# Patient Record
Sex: MALE | Race: OTHER | NOT HISPANIC OR LATINO
[De-identification: names, ages, dates, MRNs, and addresses within clinical notes are randomized per-mention and may not be internally consistent; named-entity substitution may affect disease eponyms.]

---

## 2018-05-22 ENCOUNTER — APPOINTMENT (OUTPATIENT)
Dept: ORTHOPEDIC SURGERY | Facility: CLINIC | Age: 78
End: 2018-05-22
Payer: MEDICARE

## 2018-05-22 VITALS
HEART RATE: 61 BPM | BODY MASS INDEX: 30.22 KG/M2 | SYSTOLIC BLOOD PRESSURE: 122 MMHG | DIASTOLIC BLOOD PRESSURE: 51 MMHG | WEIGHT: 188 LBS | HEIGHT: 66 IN

## 2018-05-22 DIAGNOSIS — Z47.1 AFTERCARE FOLLOWING JOINT REPLACEMENT SURGERY: ICD-10-CM

## 2018-05-22 PROCEDURE — 73562 X-RAY EXAM OF KNEE 3: CPT | Mod: 50

## 2018-05-22 PROCEDURE — 99213 OFFICE O/P EST LOW 20 MIN: CPT

## 2021-12-29 ENCOUNTER — INPATIENT (INPATIENT)
Facility: HOSPITAL | Age: 81
LOS: 7 days | Discharge: HOSPICE | DRG: 291 | End: 2022-01-06
Attending: INTERNAL MEDICINE | Admitting: HOSPITALIST
Payer: MEDICARE

## 2021-12-29 VITALS
RESPIRATION RATE: 32 BRPM | SYSTOLIC BLOOD PRESSURE: 107 MMHG | OXYGEN SATURATION: 97 % | DIASTOLIC BLOOD PRESSURE: 88 MMHG | HEIGHT: 68 IN | TEMPERATURE: 98 F | HEART RATE: 49 BPM | WEIGHT: 184.97 LBS

## 2021-12-29 PROCEDURE — 99285 EMERGENCY DEPT VISIT HI MDM: CPT | Mod: GC

## 2021-12-29 NOTE — ED ADULT TRIAGE NOTE - CHIEF COMPLAINT QUOTE
labored breathing x 5 days; cough; MILLS; hx pulmonary embolus; saw PMD and had neg covid test 7 days ago; pt is confused;

## 2021-12-30 DIAGNOSIS — I50.9 HEART FAILURE, UNSPECIFIED: ICD-10-CM

## 2021-12-30 LAB
ALBUMIN SERPL ELPH-MCNC: 3.6 G/DL — SIGNIFICANT CHANGE UP (ref 3.3–5)
ALP SERPL-CCNC: 190 U/L — HIGH (ref 40–120)
ALT FLD-CCNC: 54 U/L — HIGH (ref 10–45)
ANION GAP SERPL CALC-SCNC: 14 MMOL/L — SIGNIFICANT CHANGE UP (ref 5–17)
ANION GAP SERPL CALC-SCNC: 14 MMOL/L — SIGNIFICANT CHANGE UP (ref 5–17)
APTT BLD: 174.1 SEC — CRITICAL HIGH (ref 27.5–35.5)
APTT BLD: 29.1 SEC — SIGNIFICANT CHANGE UP (ref 27.5–35.5)
APTT BLD: >200 SEC — CRITICAL HIGH (ref 27.5–35.5)
AST SERPL-CCNC: 43 U/L — HIGH (ref 10–40)
BASOPHILS # BLD AUTO: 0.04 K/UL — SIGNIFICANT CHANGE UP (ref 0–0.2)
BASOPHILS NFR BLD AUTO: 0.4 % — SIGNIFICANT CHANGE UP (ref 0–2)
BILIRUB SERPL-MCNC: 1.3 MG/DL — HIGH (ref 0.2–1.2)
BUN SERPL-MCNC: 24 MG/DL — HIGH (ref 7–23)
BUN SERPL-MCNC: 25 MG/DL — HIGH (ref 7–23)
CALCIUM SERPL-MCNC: 8.9 MG/DL — SIGNIFICANT CHANGE UP (ref 8.4–10.5)
CALCIUM SERPL-MCNC: 9.1 MG/DL — SIGNIFICANT CHANGE UP (ref 8.4–10.5)
CHLORIDE SERPL-SCNC: 101 MMOL/L — SIGNIFICANT CHANGE UP (ref 96–108)
CHLORIDE SERPL-SCNC: 105 MMOL/L — SIGNIFICANT CHANGE UP (ref 96–108)
CO2 SERPL-SCNC: 22 MMOL/L — SIGNIFICANT CHANGE UP (ref 22–31)
CO2 SERPL-SCNC: 22 MMOL/L — SIGNIFICANT CHANGE UP (ref 22–31)
CREAT SERPL-MCNC: 1.4 MG/DL — HIGH (ref 0.5–1.3)
CREAT SERPL-MCNC: 1.47 MG/DL — HIGH (ref 0.5–1.3)
EOSINOPHIL # BLD AUTO: 0.08 K/UL — SIGNIFICANT CHANGE UP (ref 0–0.5)
EOSINOPHIL NFR BLD AUTO: 0.8 % — SIGNIFICANT CHANGE UP (ref 0–6)
GLUCOSE SERPL-MCNC: 112 MG/DL — HIGH (ref 70–99)
GLUCOSE SERPL-MCNC: 124 MG/DL — HIGH (ref 70–99)
HCT VFR BLD CALC: 42.4 % — SIGNIFICANT CHANGE UP (ref 39–50)
HCT VFR BLD CALC: 42.9 % — SIGNIFICANT CHANGE UP (ref 39–50)
HGB BLD-MCNC: 13.7 G/DL — SIGNIFICANT CHANGE UP (ref 13–17)
HGB BLD-MCNC: 13.7 G/DL — SIGNIFICANT CHANGE UP (ref 13–17)
IMM GRANULOCYTES NFR BLD AUTO: 0.4 % — SIGNIFICANT CHANGE UP (ref 0–1.5)
INR BLD: 1.26 RATIO — HIGH (ref 0.88–1.16)
LACTATE SERPL-SCNC: 1.9 MMOL/L — SIGNIFICANT CHANGE UP (ref 0.7–2)
LYMPHOCYTES # BLD AUTO: 0.91 K/UL — LOW (ref 1–3.3)
LYMPHOCYTES # BLD AUTO: 8.8 % — LOW (ref 13–44)
MAGNESIUM SERPL-MCNC: 1.9 MG/DL — SIGNIFICANT CHANGE UP (ref 1.6–2.6)
MAGNESIUM SERPL-MCNC: 2.2 MG/DL — SIGNIFICANT CHANGE UP (ref 1.6–2.6)
MCHC RBC-ENTMCNC: 28.8 PG — SIGNIFICANT CHANGE UP (ref 27–34)
MCHC RBC-ENTMCNC: 29.1 PG — SIGNIFICANT CHANGE UP (ref 27–34)
MCHC RBC-ENTMCNC: 31.9 GM/DL — LOW (ref 32–36)
MCHC RBC-ENTMCNC: 32.3 GM/DL — SIGNIFICANT CHANGE UP (ref 32–36)
MCV RBC AUTO: 90.1 FL — SIGNIFICANT CHANGE UP (ref 80–100)
MCV RBC AUTO: 90.2 FL — SIGNIFICANT CHANGE UP (ref 80–100)
MONOCYTES # BLD AUTO: 0.97 K/UL — HIGH (ref 0–0.9)
MONOCYTES NFR BLD AUTO: 9.4 % — SIGNIFICANT CHANGE UP (ref 2–14)
NEUTROPHILS # BLD AUTO: 8.26 K/UL — HIGH (ref 1.8–7.4)
NEUTROPHILS NFR BLD AUTO: 80.2 % — HIGH (ref 43–77)
NRBC # BLD: 0 /100 WBCS — SIGNIFICANT CHANGE UP (ref 0–0)
NRBC # BLD: 0 /100 WBCS — SIGNIFICANT CHANGE UP (ref 0–0)
NT-PROBNP SERPL-SCNC: HIGH PG/ML (ref 0–300)
PHOSPHATE SERPL-MCNC: 3.4 MG/DL — SIGNIFICANT CHANGE UP (ref 2.5–4.5)
PHOSPHATE SERPL-MCNC: 3.6 MG/DL — SIGNIFICANT CHANGE UP (ref 2.5–4.5)
PLATELET # BLD AUTO: 212 K/UL — SIGNIFICANT CHANGE UP (ref 150–400)
PLATELET # BLD AUTO: 222 K/UL — SIGNIFICANT CHANGE UP (ref 150–400)
POTASSIUM SERPL-MCNC: 3.3 MMOL/L — LOW (ref 3.5–5.3)
POTASSIUM SERPL-MCNC: 3.8 MMOL/L — SIGNIFICANT CHANGE UP (ref 3.5–5.3)
POTASSIUM SERPL-SCNC: 3.3 MMOL/L — LOW (ref 3.5–5.3)
POTASSIUM SERPL-SCNC: 3.8 MMOL/L — SIGNIFICANT CHANGE UP (ref 3.5–5.3)
PROT SERPL-MCNC: 6.5 G/DL — SIGNIFICANT CHANGE UP (ref 6–8.3)
PROTHROM AB SERPL-ACNC: 14.9 SEC — HIGH (ref 10.6–13.6)
RBC # BLD: 4.7 M/UL — SIGNIFICANT CHANGE UP (ref 4.2–5.8)
RBC # BLD: 4.76 M/UL — SIGNIFICANT CHANGE UP (ref 4.2–5.8)
RBC # FLD: 13.1 % — SIGNIFICANT CHANGE UP (ref 10.3–14.5)
RBC # FLD: 13.1 % — SIGNIFICANT CHANGE UP (ref 10.3–14.5)
SARS-COV-2 RNA SPEC QL NAA+PROBE: SIGNIFICANT CHANGE UP
SODIUM SERPL-SCNC: 137 MMOL/L — SIGNIFICANT CHANGE UP (ref 135–145)
SODIUM SERPL-SCNC: 141 MMOL/L — SIGNIFICANT CHANGE UP (ref 135–145)
TROPONIN T, HIGH SENSITIVITY RESULT: 57 NG/L — HIGH (ref 0–51)
TROPONIN T, HIGH SENSITIVITY RESULT: 62 NG/L — HIGH (ref 0–51)
WBC # BLD: 10.3 K/UL — SIGNIFICANT CHANGE UP (ref 3.8–10.5)
WBC # BLD: 9.94 K/UL — SIGNIFICANT CHANGE UP (ref 3.8–10.5)
WBC # FLD AUTO: 10.3 K/UL — SIGNIFICANT CHANGE UP (ref 3.8–10.5)
WBC # FLD AUTO: 9.94 K/UL — SIGNIFICANT CHANGE UP (ref 3.8–10.5)

## 2021-12-30 PROCEDURE — 71045 X-RAY EXAM CHEST 1 VIEW: CPT | Mod: 26

## 2021-12-30 PROCEDURE — 93970 EXTREMITY STUDY: CPT | Mod: 26

## 2021-12-30 PROCEDURE — 71275 CT ANGIOGRAPHY CHEST: CPT | Mod: 26,MA

## 2021-12-30 PROCEDURE — 93306 TTE W/DOPPLER COMPLETE: CPT | Mod: 26

## 2021-12-30 PROCEDURE — 70450 CT HEAD/BRAIN W/O DYE: CPT | Mod: 26,MA

## 2021-12-30 PROCEDURE — 93010 ELECTROCARDIOGRAM REPORT: CPT

## 2021-12-30 RX ORDER — HEPARIN SODIUM 5000 [USP'U]/ML
1000 INJECTION INTRAVENOUS; SUBCUTANEOUS
Qty: 25000 | Refills: 0 | Status: DISCONTINUED | OUTPATIENT
Start: 2021-12-30 | End: 2022-01-02

## 2021-12-30 RX ORDER — METOPROLOL TARTRATE 50 MG
25 TABLET ORAL DAILY
Refills: 0 | Status: DISCONTINUED | OUTPATIENT
Start: 2021-12-30 | End: 2022-01-04

## 2021-12-30 RX ORDER — POTASSIUM CHLORIDE 20 MEQ
10 PACKET (EA) ORAL
Refills: 0 | Status: DISCONTINUED | OUTPATIENT
Start: 2021-12-30 | End: 2021-12-30

## 2021-12-30 RX ORDER — FUROSEMIDE 40 MG
20 TABLET ORAL ONCE
Refills: 0 | Status: COMPLETED | OUTPATIENT
Start: 2021-12-30 | End: 2021-12-30

## 2021-12-30 RX ORDER — HEPARIN SODIUM 5000 [USP'U]/ML
7000 INJECTION INTRAVENOUS; SUBCUTANEOUS ONCE
Refills: 0 | Status: COMPLETED | OUTPATIENT
Start: 2021-12-30 | End: 2021-12-30

## 2021-12-30 RX ORDER — HEPARIN SODIUM 5000 [USP'U]/ML
INJECTION INTRAVENOUS; SUBCUTANEOUS
Qty: 25000 | Refills: 0 | Status: DISCONTINUED | OUTPATIENT
Start: 2021-12-30 | End: 2021-12-30

## 2021-12-30 RX ORDER — HEPARIN SODIUM 5000 [USP'U]/ML
7000 INJECTION INTRAVENOUS; SUBCUTANEOUS EVERY 6 HOURS
Refills: 0 | Status: DISCONTINUED | OUTPATIENT
Start: 2021-12-30 | End: 2022-01-02

## 2021-12-30 RX ORDER — HEPARIN SODIUM 5000 [USP'U]/ML
3500 INJECTION INTRAVENOUS; SUBCUTANEOUS EVERY 6 HOURS
Refills: 0 | Status: DISCONTINUED | OUTPATIENT
Start: 2021-12-30 | End: 2022-01-02

## 2021-12-30 RX ORDER — MAGNESIUM SULFATE 500 MG/ML
2 VIAL (ML) INJECTION ONCE
Refills: 0 | Status: COMPLETED | OUTPATIENT
Start: 2021-12-30 | End: 2021-12-30

## 2021-12-30 RX ORDER — POTASSIUM CHLORIDE 20 MEQ
40 PACKET (EA) ORAL ONCE
Refills: 0 | Status: COMPLETED | OUTPATIENT
Start: 2021-12-30 | End: 2021-12-30

## 2021-12-30 RX ORDER — FUROSEMIDE 40 MG
40 TABLET ORAL DAILY
Refills: 0 | Status: DISCONTINUED | OUTPATIENT
Start: 2021-12-30 | End: 2022-01-03

## 2021-12-30 RX ADMIN — HEPARIN SODIUM 7000 UNIT(S): 5000 INJECTION INTRAVENOUS; SUBCUTANEOUS at 06:14

## 2021-12-30 RX ADMIN — Medication 40 MILLIEQUIVALENT(S): at 05:32

## 2021-12-30 RX ADMIN — HEPARIN SODIUM 1300 UNIT(S)/HR: 5000 INJECTION INTRAVENOUS; SUBCUTANEOUS at 15:30

## 2021-12-30 RX ADMIN — Medication 25 GRAM(S): at 05:50

## 2021-12-30 RX ADMIN — HEPARIN SODIUM 1600 UNIT(S)/HR: 5000 INJECTION INTRAVENOUS; SUBCUTANEOUS at 06:15

## 2021-12-30 RX ADMIN — HEPARIN SODIUM 0 UNIT(S)/HR: 5000 INJECTION INTRAVENOUS; SUBCUTANEOUS at 14:30

## 2021-12-30 RX ADMIN — HEPARIN SODIUM 1000 UNIT(S)/HR: 5000 INJECTION INTRAVENOUS; SUBCUTANEOUS at 23:06

## 2021-12-30 RX ADMIN — Medication 20 MILLIGRAM(S): at 05:51

## 2021-12-30 NOTE — PHYSICAL THERAPY INITIAL EVALUATION ADULT - ADDITIONAL COMMENTS
Pt confused unable to provide past level of functioning/social/living condition. PT will follow and obtain info next PT session.

## 2021-12-30 NOTE — ED ADULT NURSE NOTE - NSIMPLEMENTINTERV_GEN_ALL_ED
Implemented All Fall Risk Interventions:  [place] to call system. Call bell, personal items and telephone within reach. Instruct patient to call for assistance. Room bathroom lighting operational. Non-slip footwear when patient is off stretcher. Physically safe environment: no spills, clutter or unnecessary equipment. Stretcher in lowest position, wheels locked, appropriate side rails in place. Provide visual cue, wrist band, yellow gown, etc. Monitor gait and stability. Monitor for mental status changes and reorient to person, place, and time. Review medications for side effects contributing to fall risk. Reinforce activity limits and safety measures with patient and family.

## 2021-12-30 NOTE — ED PROVIDER NOTE - CLINICAL SUMMARY MEDICAL DECISION MAKING FREE TEXT BOX
Nursing notes reviewed and accepted.    Dariel Rodriguez is a 4 year old male who presents for well child exam.  Patient presents with Mother.    Concerns raised today include: none     Diet / Eating: good eater  Milk: skim   Sleep: No problems or concerns.  Elimination: No problems or concerns.    SOCIAL:  School: [employer] /  grade  Concerns for school performance: None  Concerns for behavior: None  Interests / Activities:  Play outside    DEVELOPMENT:  states seasons of the year, recites days of the week backward, stands heel to toe with eyes closed, states 4-6 digits forward, states 4-6 digits backward, states phone number, home address, has close friends and reading and math at grade level    Medical history, surgical history, and family history reviewed and updated.    PHYSICAL EXAM:  Pulse 68, temperature 97.3 °F (36.3 °C), temperature source Temporal, resp. rate 20, height 4' 0.03\" (1.22 m), weight 22.1 kg, SpO2 95 %.  GENERAL:  Well appearing  male, nontoxic, no acute distress.  Alert and interactive.  SKIN: Warm, normal turgor.  No cyanosis.  No bruises or lesions.  HEAD:  Normocephalic, atraumatic.  EYES: PERRL, EOMI.  NOSE: No flaring.  EARS:  TMs transparent with good landmarks.  THROAT:  Oropharynx with moist mucous membranes and no lesions.  NECK:  Supple, no lymphadenopathy or masses.  HEART:  Regular rate and rhythm.  Quiet precordium.  Normal S1, S2.  No murmurs, rubs, gallops.   LUNGS:  Clear to auscultation bilaterally.  No wheezes, rales, rhonchi.  Normal work of breathing.  ABDOMEN:  Soft, nontender.  No organomegaly or masses.  GENITOURINARY:  Adam 1 male and Testes descended bilaterally.    EXTREMITIES:  Warm, dry, without abnormalities.  NEUROLOGIC:  Normal tone, bulk, strength.    ASSESSMENT AND PLAN:  4 year old male well child.    All parental concerns and questions discussed.  Anticipatory guidance provided, handout given.              Safety/car/bicycle/fire/sharp objects/falls/water               Development              Discipline              Diet              Television              Analgesics/antipyretics              Sun exposure              Tobacco-free home              Dental care                 Immunizations per orders.  Risks, benefits, and side effects discussed.  RTC in 1 year for well-child exam or sooner prn illness/concerns.   pt found to have ADCHF, DVT started on heparin. Admit to tele for further workup and management Dr. Ani Coulter: 82 yo hx HTN HLD PVD. DVT/PE no longer on AC,  TIA, CVA.  BPH presented to ED with acute onset dyspnea and AMS since this morning. Unable to obtain history from patient due to AMS. Per daughter, pt has worsening mental status for the past few months, has increased work of breathing and AMS this morning which prompted the ED visit. On exam: Pt awake, alert but only oriented to self. +Rales at the lung bases. +LE edema. Will r/o ACS, PE, DVT. Plan: Labs, cxr, EKG, CTA chest. CT head. Reassess

## 2021-12-30 NOTE — ED ADULT NURSE REASSESSMENT NOTE - NS ED NURSE REASSESS COMMENT FT1
WAR room called, pt having frequent PVCs & runs of VTACH, non-sustained. Print out completed, given to MD Coulter. 20 gauge IV established to left hand.

## 2021-12-30 NOTE — PHYSICAL THERAPY INITIAL EVALUATION ADULT - PERTINENT HX OF CURRENT PROBLEM, REHAB EVAL
Mr Wayne is a 82 yo hx HTN HLD PVD. 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  BPH pw confusion. Unable to obtain review of systems due to patient condition. Pt denies pain or shortness of breath, but pw tachypnea.

## 2021-12-30 NOTE — CONSULT NOTE ADULT - SUBJECTIVE AND OBJECTIVE BOX
Cardiovascular Disease Initial Evaluation    CHIEF COMPLAINT: AMS, SOB    HISTORY OF PRESENT ILLNESS: Mr. Black is an 81 M with pmhx of HTN HLD PVD. 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  BPH pw confusion. Unable to obtain review of systems due to patient condition. Pt denies pain or shortness of breath, but pw tachypnea. He believes he is in [place] and it is 1984. Daughter states that his breathing became more labored and he has become more short of breath. Daughter states that he has become more confused in the past few months but that it has suddenly gotten worse as of this morning when he could not speak full sentences coherently. They also state that his dyspnea has worsened to the point where he feels short of breath sitting down. Upon arrival to the ED, pt was found to be in heart failure. CTA chest revealed no PE. TTE showed a severely reduced LVEF with multiple LV thrombi. Cardiology consulted for further management. ROS unable to be obtained 2/2 AMS.       Allergies    No Known Allergies    Intolerances    	    MEDICATIONS:  heparin   Injectable 7000 Unit(s) IV Push every 6 hours PRN  heparin   Injectable 3500 Unit(s) IV Push every 6 hours PRN  heparin  Infusion.  Unit(s)/Hr IV Continuous <Continuous>                  PAST MEDICAL & SURGICAL HISTORY:  History of BPH    Prostate cancer    Pulmonary embolism        FAMILY HISTORY:      SOCIAL HISTORY:    The patient is a nonsmoker       REVIEW OF SYSTEMS:  See HPI, otherwise complete 14 point review of systems negative    [ ] All others negative	  [ x] Unable to obtain    PHYSICAL EXAM:  T(C): 36.2 (12-30-21 @ 12:20), Max: 36.8 (12-30-21 @ 04:15)  HR: 57 (12-30-21 @ 12:20) (49 - 83)  BP: 111/75 (12-30-21 @ 12:20) (107/88 - 138/67)  RR: 16 (12-30-21 @ 12:20) (16 - 32)  SpO2: 95% (12-30-21 @ 12:20) (92% - 98%)  Wt(kg): --  I&O's Summary    30 Dec 2021 07:01  -  30 Dec 2021 15:10  --------------------------------------------------------  IN: 120 mL / OUT: 0 mL / NET: 120 mL        Appearance: No Acute Distress; resting comfortably  HEENT:  Normal oral mucosa, PERRL, EOMI	  Cardiovascular: Normal S1 S2, No JVD, No murmurs/rubs/gallops  Respiratory: Normal respiratory effort; Lungs clear to auscultation bilaterally  Gastrointestinal:  Soft, Non-tender, + BS	  Skin: No rashes, No ecchymoses, No cyanosis	  Neurologic: Non-focal; no weakness  Extremities: No clubbing, cyanosis or edema  Vascular: Peripheral pulses palpable 2+ bilaterally  Psychiatry: A & O x 3, Mood & affect appropriate    Laboratory Data:	 	    CBC Full  -  ( 30 Dec 2021 13:18 )  WBC Count : 9.94 K/uL  Hemoglobin : 13.7 g/dL  Hematocrit : 42.4 %  Platelet Count - Automated : 222 K/uL  Mean Cell Volume : 90.2 fl  Mean Cell Hemoglobin : 29.1 pg  Mean Cell Hemoglobin Concentration : 32.3 gm/dL  Auto Neutrophil # : x  Auto Lymphocyte # : x  Auto Monocyte # : x  Auto Eosinophil # : x  Auto Basophil # : x  Auto Neutrophil % : x  Auto Lymphocyte % : x  Auto Monocyte % : x  Auto Eosinophil % : x  Auto Basophil % : x    12-30    137  |  101  |  25<H>  ----------------------------<  112<H>  3.3<L>   |  22  |  1.40<H>    Ca    9.1      30 Dec 2021 01:37  Phos  3.4     12-30  Mg     1.9     12-30    TPro  6.5  /  Alb  3.6  /  TBili  1.3<H>  /  DBili  x   /  AST  43<H>  /  ALT  54<H>  /  AlkPhos  190<H>  12-30      proBNP: Serum Pro-Brain Natriuretic Peptide: 88998 pg/mL (12-30 @ 01:37)    Lipid Profile:   HgA1c:   TSH:       CARDIAC MARKERS:            Interpretation of Telemetry: Sinus rhythm with PVCs  ECG:  Sinus rhythm with multifocal PVCs  RADIOLOGY: CXR: Small pleural effusions.   OTHER: CT head: negative for intracranial hemorrhage.   CTA chest: Limited 2/2 motion however no PE seen.     PREVIOUS DIAGNOSTIC TESTING:    [x ] Echocardiogram: 12/30: LVEF 15% With severe LV global systolic dysfunction. Multiple LV thrombi seen at apex with largest measuring 1.7x1.6cm. Moderate MR, RV enlargement and dysfunction and moderate pulmonary hypertension.   [ ] Catheterization:  [ ] Stress Test:  	    Assessment: 81 M with pmhx of HTN HLD PVD. 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  BPH pw confusion.    Plan of Care:    #LV thrombus  - Pt found to have multiple LV thrombi on TTE  - Continue heparin infusion with plan to transition to Warfarin.   - TTE shows severely reduced LVEF at 15% with moderate MR, and severe LV and RV dysfunction.   - Given patients AMS and advanced age, pt is currently not a candidate for ischemic evaluation with catheterization. Will proceed with medical therapy and will continue to reassess patient for improvement in mental status.     #Acute decompensated systolic heart failure  - BNP elevated  - IV diuresis   - Monitor I and O, replenish lytes as needed  - Troponins elevated 2/2 demand ischemia  - Medical management at this time.     #Acute renal failure  - Unknown baseline.   - Management as per nephro    #LLE DVT  - IV heparin  - Would consider heme/onc input for hypercoagulable workup given history of DVTs in the past     #HTN  BP acceptable  - Pt not on medications for htn    #HLD  Statin therapy    #ACP (advance care planning)-  Advanced care planning was addressed. At this time, we will continue to monitor patient and diurese.  Will consider catheterization if mentation improves.  Risks, benefits and alternatives of medical treatment and procedures were discussed in detail and all questions were answered. 30 minutes spent addressing advance care plans.        72 minutes spent on total encounter; more than 50% of the visit was spent counseling and/or coordinating care by the attending physician.   	  Jacky Mccarthy D.O.   Cardiovascular Diseases  [phone]     Cardiovascular Disease Initial Evaluation    CHIEF COMPLAINT: AMS, SOB    HISTORY OF PRESENT ILLNESS: Mr. Black is an 81 M with pmhx of HTN HLD PVD. 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  BPH pw confusion. Unable to obtain review of systems due to patient condition. Pt denies pain or shortness of breath, but pw tachypnea. He believes he is in [place] and it is 1984. Daughter states that his breathing became more labored and he has become more short of breath. Daughter states that he has become more confused in the past few months but that it has suddenly gotten worse as of this morning when he could not speak full sentences coherently. They also state that his dyspnea has worsened to the point where he feels short of breath sitting down. Upon arrival to the ED, pt was found to be in heart failure. CTA chest revealed no PE. TTE showed a severely reduced LVEF with multiple LV thrombi. Cardiology consulted for further management. ROS unable to be obtained 2/2 AMS.       Allergies    No Known Allergies    Intolerances    	    MEDICATIONS:  heparin   Injectable 7000 Unit(s) IV Push every 6 hours PRN  heparin   Injectable 3500 Unit(s) IV Push every 6 hours PRN  heparin  Infusion.  Unit(s)/Hr IV Continuous <Continuous>                  PAST MEDICAL & SURGICAL HISTORY:  History of BPH    Prostate cancer    Pulmonary embolism        FAMILY HISTORY:      SOCIAL HISTORY:    The patient is a nonsmoker       REVIEW OF SYSTEMS:  See HPI, otherwise complete 14 point review of systems negative    [ ] All others negative	  [ x] Unable to obtain    PHYSICAL EXAM:  T(C): 36.2 (12-30-21 @ 12:20), Max: 36.8 (12-30-21 @ 04:15)  HR: 57 (12-30-21 @ 12:20) (49 - 83)  BP: 111/75 (12-30-21 @ 12:20) (107/88 - 138/67)  RR: 16 (12-30-21 @ 12:20) (16 - 32)  SpO2: 95% (12-30-21 @ 12:20) (92% - 98%)  Wt(kg): --  I&O's Summary    30 Dec 2021 07:01  -  30 Dec 2021 15:10  --------------------------------------------------------  IN: 120 mL / OUT: 0 mL / NET: 120 mL        Appearance: No Acute Distress; resting comfortably  HEENT:  Normal oral mucosa, PERRL, EOMI	  Cardiovascular: Normal S1 S2, No JVD, No murmurs/rubs/gallops  Respiratory: Normal respiratory effort; Decreased breath sounds bilaterally  Gastrointestinal:  Soft, Non-tender, + BS	  Skin: No rashes, No ecchymoses, No cyanosis	  Neurologic: Non-focal; no weakness  Extremities: Trace edema in LE  Vascular: Peripheral pulses palpable 2+ bilaterally  Psychiatry: A & O x 1, Mood & affect appropriate    Laboratory Data:	 	    CBC Full  -  ( 30 Dec 2021 13:18 )  WBC Count : 9.94 K/uL  Hemoglobin : 13.7 g/dL  Hematocrit : 42.4 %  Platelet Count - Automated : 222 K/uL  Mean Cell Volume : 90.2 fl  Mean Cell Hemoglobin : 29.1 pg  Mean Cell Hemoglobin Concentration : 32.3 gm/dL  Auto Neutrophil # : x  Auto Lymphocyte # : x  Auto Monocyte # : x  Auto Eosinophil # : x  Auto Basophil # : x  Auto Neutrophil % : x  Auto Lymphocyte % : x  Auto Monocyte % : x  Auto Eosinophil % : x  Auto Basophil % : x    12-30    137  |  101  |  25<H>  ----------------------------<  112<H>  3.3<L>   |  22  |  1.40<H>    Ca    9.1      30 Dec 2021 01:37  Phos  3.4     12-30  Mg     1.9     12-30    TPro  6.5  /  Alb  3.6  /  TBili  1.3<H>  /  DBili  x   /  AST  43<H>  /  ALT  54<H>  /  AlkPhos  190<H>  12-30      proBNP: Serum Pro-Brain Natriuretic Peptide: 52933 pg/mL (12-30 @ 01:37)    Lipid Profile:   HgA1c:   TSH:       CARDIAC MARKERS:            Interpretation of Telemetry: Sinus rhythm with multifocal PVCs and bigeminy  ECG:  Sinus rhythm with multifocal PVCs  RADIOLOGY: CXR: Small pleural effusions.   OTHER: CT head: negative for intracranial hemorrhage.   CTA chest: Limited 2/2 motion however no PE seen.     PREVIOUS DIAGNOSTIC TESTING:    [x ] Echocardiogram: 12/30: LVEF 15% With severe LV global systolic dysfunction. Multiple LV thrombi seen at apex with largest measuring 1.7x1.6cm. Moderate MR, RV enlargement and dysfunction and moderate pulmonary hypertension.   [ ] Catheterization:  [ ] Stress Test:  	    Assessment: 81 M with pmhx of HTN HLD PVD. 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  BPH pw confusion.    Plan of Care:    #LV thrombus  - Pt found to have multiple LV thrombi on TTE  - Continue heparin infusion with plan to transition to Warfarin.   - TTE shows severely reduced LVEF at 15% with moderate MR, and severe LV and RV dysfunction.   - Given patients AMS/ dementia and advanced age, pt is currently not a candidate for ischemic evaluation with catheterization. Will proceed with medical therapy and will continue to reassess patient for improvement in mental status.     #Acute decompensated systolic heart failure  - BNP elevated  - IV diuresis   - Monitor I and O, replenish lytes as needed  - Troponins elevated 2/2 demand ischemia  - Medical management at this time.     #Acute renal failure  - Unknown baseline.   - Management as per nephro    #LLE DVT  - IV heparin  - Would consider heme/onc input for hypercoagulable workup given history of DVTs in the past     #HTN  BP acceptable  - Pt not on medications for htn    #HLD  Statin therapy    #ACP (advance care planning)-  Advanced care planning was addressed. At this time, we will continue to monitor patient and diurese.  Will consider catheterization if mentation improves.  Risks, benefits and alternatives of medical treatment and procedures were discussed in detail and all questions were answered. 30 minutes spent addressing advance care plans.        72 minutes spent on total encounter; more than 50% of the visit was spent counseling and/or coordinating care by the attending physician.   	  Jacky Mccarthy D.O.   Cardiovascular Diseases  [phone]

## 2021-12-30 NOTE — ED PROVIDER NOTE - OBJECTIVE STATEMENT
Mr Wayne is a 82 yo hx HTN HLD PVD. 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  BPH pw confusion. Unable to obtain review of systems due to patient condition. Pt denies pain or shortness of breath, but pw tachypnea. He believes he is in [place] and it is 1986. Daughter states that his breathing became more labored and he has become more short of breath. Daughter states that he has become more confused in the past few months but that it has suddenly gotten worse. Mr Wayne is a 82 yo hx HTN HLD PVD. 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  BPH pw confusion. Unable to obtain review of systems due to patient condition. Pt denies pain or shortness of breath, but pw tachypnea. He believes he is in [place] and it is 1986. Daughter states that his breathing became more labored and he has become more short of breath. Daughter states that he has become more confused in the past few months but that it has suddenly gotten worse as of this morning when he could not speak full sentences coherently. They also state that his dyspnea has worsened to the point where he feels short of breath sitting down.

## 2021-12-30 NOTE — ED PROVIDER NOTE - PHYSICAL EXAMINATION
Vital Signs Last 24 Hrs  T(C): 36.4 (29 Dec 2021 23:30), Max: 36.4 (29 Dec 2021 23:30)  T(F): 97.5 (29 Dec 2021 23:30), Max: 97.5 (29 Dec 2021 23:30)  HR: 49 (29 Dec 2021 23:30) (49 - 49)  BP: 107/88 (29 Dec 2021 23:30) (107/88 - 107/88)  BP(mean): --  RR: 32 (29 Dec 2021 23:30) (32 - 32)  SpO2: 97% (29 Dec 2021 23:30) (97% - 97%)  ____________________  PHYSICAL EXAM:  · CONSTITUTIONAL:	Well-developed, well nourished  · NECK:	No bruits; no thyromegaly. No JVD  ·RESPIRATORY:  Tachypnea. Bibasilar crackles no rales,rhonchi +wheeze. No increased work of breathing.   · CARDIOVASCULAR	RRR  no m/r/g  . GASTROINTESTINAL:  Soft, non tender. No organomegaly. No guarding.  . GENITOURINARY:  +suprapubic tenderness. No costrovertrebral angle tenderness.   . EXTREMITIES: Warm. No cyanosis, clubbing or edema. DP/PT pulses intact.  · NEUROLOGICAL:   alert and oriented x 1. Unable to perform full exam    · SKIN:	No lesions; no rash  . LYMPH NODES:	No lymphadedenopathy  · MUSCULOSKELETAL:   No calf tenderness  no joint swelling

## 2021-12-30 NOTE — ED PROVIDER NOTE - PROGRESS NOTE DETAILS
6 beats wide complex tachy on tele from war room. Pt HDS pt found to have ADCHF, DVT started on heparin. Admit to tele for further workup and management

## 2021-12-30 NOTE — ED ADULT NURSE REASSESSMENT NOTE - NS ED NURSE REASSESS COMMENT FT1
US called, reports that pt is non-compliant with US exam, US unable to be performed, pt to be brought back to ED by ED transport. MD Duarte notified.

## 2021-12-30 NOTE — PROVIDER CONTACT NOTE (CRITICAL VALUE NOTIFICATION) - ACTION/TREATMENT ORDERED:
Provider made aware of critical result, heparin nomogram followed
NANYC Salazar made aware. Continue to follow full anticoag nomogram and monitor pt

## 2021-12-30 NOTE — H&P ADULT - ASSESSMENT
# MILLS Acute congestive Heart failure iv Lasix   Echo  Cards eval     # LV Thrombus Heparin drip.   TTE shows severely reduced LVEF at 15% with moderate MR, and severe LV and RV dysfunction.   - Given patients AMS/ dementia and advanced age, pt is currently not a candidate for ischemic evaluation with catheterization    # Acute Metabolic Encephalopthy -   Dementia Weakness Neuro eval

## 2021-12-30 NOTE — ED ADULT NURSE REASSESSMENT NOTE - NS ED NURSE REASSESS COMMENT FT1
Pt returned from US, pt calm at this time, states "my leg was hurting when they were doing the US. I didn't want to do it." Pt NAD at this time, SOB on exertion, VSS.

## 2021-12-30 NOTE — H&P ADULT - HISTORY OF PRESENT ILLNESS
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  BPH pw confusion.   Obtained history from patients daughter Chika - Patient noted to have shortness of breath   noted to have dyspnea on exertion associated with cough tested for Covid negative pmd s office.     Unable to obtain review of systems due to patient condition.Patient poor historian.   Patient  s/p cataract surgery wears glasses since then.

## 2021-12-30 NOTE — ED PROVIDER NOTE - ATTENDING CONTRIBUTION TO CARE
I personally saw the patient and performed a substantive portion of the visit including all aspects of the medical decision making.  I discussed their management with the resident. I reviewed the resident's note and agree with the documented findings and plan of care. My medical decision making and observations are found above.    Please refer to MDM

## 2021-12-30 NOTE — ED ADULT NURSE NOTE - ED STAT RN HANDOFF DETAILS
Report given to receiving change of shift RN Benigno. patient is in no acute distress. Plan of care explained, pt currently receiving heparin transfusion for DVT & magnesium for hypomagnesemia. Pt Aox1 at this time, placed near RN station.

## 2021-12-30 NOTE — ED ADULT NURSE NOTE - OBJECTIVE STATEMENT
80 y/o male with PMH HTN HLD PVD DVT/ PE, TIA, CVA, BPH presenting to ED for "labored breathing x 5 days." Pt is confused AOx1 and unable to provide clear history. Pt currently denies pain or shortness of breath.  Daughter states that he has become more confused in the past few months but that it has suddenly gotten worse as of this morning when he could not speak full sentences coherently. They also state that his dyspnea has worsened to the point where he feels short of breath sitting down. Upon exam pt A&Ox1, no difficulty speaking in complete sentences, s1s2 heart sounds heard, pulses x 4, thomas x4, abdomen soft nontender nondistended. pt denies chest pain, sob, ha, n/v/d, abdominal pain, f/c, urinary symptoms, hematuria. 18 gauge IV placed to left a/c. Placed on cardiac monitor NSR 60s.

## 2021-12-31 DIAGNOSIS — F03.90 UNSPECIFIED DEMENTIA, UNSPECIFIED SEVERITY, WITHOUT BEHAVIORAL DISTURBANCE, PSYCHOTIC DISTURBANCE, MOOD DISTURBANCE, AND ANXIETY: ICD-10-CM

## 2021-12-31 LAB
ALBUMIN SERPL ELPH-MCNC: 3.5 G/DL — SIGNIFICANT CHANGE UP (ref 3.3–5)
ALP SERPL-CCNC: 167 U/L — HIGH (ref 40–120)
ALT FLD-CCNC: 43 U/L — SIGNIFICANT CHANGE UP (ref 10–45)
ANION GAP SERPL CALC-SCNC: 12 MMOL/L — SIGNIFICANT CHANGE UP (ref 5–17)
APTT BLD: 74.8 SEC — HIGH (ref 27.5–35.5)
APTT BLD: 77.9 SEC — HIGH (ref 27.5–35.5)
AST SERPL-CCNC: 33 U/L — SIGNIFICANT CHANGE UP (ref 10–40)
BILIRUB SERPL-MCNC: 1.4 MG/DL — HIGH (ref 0.2–1.2)
BUN SERPL-MCNC: 26 MG/DL — HIGH (ref 7–23)
CALCIUM SERPL-MCNC: 9.3 MG/DL — SIGNIFICANT CHANGE UP (ref 8.4–10.5)
CHLORIDE SERPL-SCNC: 107 MMOL/L — SIGNIFICANT CHANGE UP (ref 96–108)
CO2 SERPL-SCNC: 20 MMOL/L — LOW (ref 22–31)
CREAT SERPL-MCNC: 1.4 MG/DL — HIGH (ref 0.5–1.3)
FOLATE SERPL-MCNC: >20 NG/ML — SIGNIFICANT CHANGE UP
GLUCOSE SERPL-MCNC: 102 MG/DL — HIGH (ref 70–99)
HCT VFR BLD CALC: 45 % — SIGNIFICANT CHANGE UP (ref 39–50)
HGB BLD-MCNC: 14.2 G/DL — SIGNIFICANT CHANGE UP (ref 13–17)
MCHC RBC-ENTMCNC: 29.3 PG — SIGNIFICANT CHANGE UP (ref 27–34)
MCHC RBC-ENTMCNC: 31.6 GM/DL — LOW (ref 32–36)
MCV RBC AUTO: 93 FL — SIGNIFICANT CHANGE UP (ref 80–100)
NRBC # BLD: 0 /100 WBCS — SIGNIFICANT CHANGE UP (ref 0–0)
PLATELET # BLD AUTO: 124 K/UL — LOW (ref 150–400)
POTASSIUM SERPL-MCNC: 4.2 MMOL/L — SIGNIFICANT CHANGE UP (ref 3.5–5.3)
POTASSIUM SERPL-SCNC: 4.2 MMOL/L — SIGNIFICANT CHANGE UP (ref 3.5–5.3)
PROT SERPL-MCNC: 6.3 G/DL — SIGNIFICANT CHANGE UP (ref 6–8.3)
RBC # BLD: 4.84 M/UL — SIGNIFICANT CHANGE UP (ref 4.2–5.8)
RBC # FLD: 13.3 % — SIGNIFICANT CHANGE UP (ref 10.3–14.5)
SODIUM SERPL-SCNC: 139 MMOL/L — SIGNIFICANT CHANGE UP (ref 135–145)
TSH SERPL-MCNC: 3.03 UIU/ML — SIGNIFICANT CHANGE UP (ref 0.27–4.2)
VIT B12 SERPL-MCNC: 1157 PG/ML — SIGNIFICANT CHANGE UP (ref 232–1245)
WBC # BLD: 7.82 K/UL — SIGNIFICANT CHANGE UP (ref 3.8–10.5)
WBC # FLD AUTO: 7.82 K/UL — SIGNIFICANT CHANGE UP (ref 3.8–10.5)

## 2021-12-31 PROCEDURE — 99223 1ST HOSP IP/OBS HIGH 75: CPT | Mod: GC

## 2021-12-31 RX ORDER — LANOLIN ALCOHOL/MO/W.PET/CERES
3 CREAM (GRAM) TOPICAL AT BEDTIME
Refills: 0 | Status: DISCONTINUED | OUTPATIENT
Start: 2021-12-31 | End: 2022-01-06

## 2021-12-31 RX ORDER — QUETIAPINE FUMARATE 200 MG/1
25 TABLET, FILM COATED ORAL AT BEDTIME
Refills: 0 | Status: DISCONTINUED | OUTPATIENT
Start: 2021-12-31 | End: 2022-01-06

## 2021-12-31 RX ORDER — METOPROLOL TARTRATE 50 MG
25 TABLET ORAL ONCE
Refills: 0 | Status: COMPLETED | OUTPATIENT
Start: 2021-12-31 | End: 2021-12-31

## 2021-12-31 RX ORDER — POTASSIUM CHLORIDE 20 MEQ
20 PACKET (EA) ORAL ONCE
Refills: 0 | Status: COMPLETED | OUTPATIENT
Start: 2021-12-31 | End: 2021-12-31

## 2021-12-31 RX ADMIN — QUETIAPINE FUMARATE 25 MILLIGRAM(S): 200 TABLET, FILM COATED ORAL at 21:40

## 2021-12-31 RX ADMIN — Medication 20 MILLIEQUIVALENT(S): at 00:17

## 2021-12-31 RX ADMIN — Medication 25 MILLIGRAM(S): at 09:02

## 2021-12-31 RX ADMIN — HEPARIN SODIUM 1000 UNIT(S)/HR: 5000 INJECTION INTRAVENOUS; SUBCUTANEOUS at 05:49

## 2021-12-31 RX ADMIN — HEPARIN SODIUM 1000 UNIT(S)/HR: 5000 INJECTION INTRAVENOUS; SUBCUTANEOUS at 15:38

## 2021-12-31 RX ADMIN — Medication 40 MILLIGRAM(S): at 05:01

## 2021-12-31 RX ADMIN — Medication 25 MILLIGRAM(S): at 00:17

## 2021-12-31 RX ADMIN — Medication 3 MILLIGRAM(S): at 21:40

## 2021-12-31 NOTE — BH CONSULTATION LIAISON ASSESSMENT NOTE - NSBHCHARTREVIEWINVESTIGATE_PSY_A_CORE FT
Ventricular Rate 93 BPM    Atrial Rate 93 BPM    P-R Interval 136 ms    QRS Duration 108 ms    Q-T Interval 390 ms    QTC Calculation(Bazett) 484 ms    P Axis 64 degrees    R Axis 4 degrees    T Axis 101 degrees    Diagnosis Line SINUS RHYTHM WITH SINUS ARRHYTHMIA WITH FREQUENT , AND CONSECUTIVE PREMATURE VENTRICULAR COMPLEXES  NONSPECIFIC ST AND T WAVE ABNORMALITY  PROLONGED QT  ABNORMAL ECG  WHEN COMPARED WITH ECG OF 29-DEC-2021 23:53,  NO SIGNIFICANT CHANGE WAS FOUND  Confirmed by HUI CAMP MD ([id number]) on 12/31/2021 3:01:35 PM

## 2021-12-31 NOTE — CONSULT NOTE ADULT - SUBJECTIVE AND OBJECTIVE BOX
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  BPH pw confusion. Obtained history from patients daughter Chika - Patient noted to have shortness of breath   noted to have dyspnea on exertion associated with cough tested for Covid negative pmd s office. Unable to obtain review of systems due to patient condition.Patient poor historian.   Patient  s/p cataract surgery wears glasses since then.       PAST MEDICAL & SURGICAL HISTORY:  History of BPH    Prostate cancer    Pulmonary embolism        MEDICATIONS  (STANDING):  furosemide   Injectable 40 milliGRAM(s) IV Push daily  heparin  Infusion. 1000 Unit(s)/Hr (10 mL/Hr) IV Continuous <Continuous>  metoprolol succinate ER 25 milliGRAM(s) Oral daily    MEDICATIONS  (PRN):  heparin   Injectable 7000 Unit(s) IV Push every 6 hours PRN For aPTT less than 40  heparin   Injectable 3500 Unit(s) IV Push every 6 hours PRN For aPTT between 40 - 57      FAMILY HISTORY:      SOCIAL HISTORY:    [ x] Non-smoker  [ ] Smoker  [ ] Alcohol    Allergies    No Known Allergies    Intolerances    	    REVIEW OF SYSTEMS:  CONSTITUTIONAL: No fever, weight loss, or fatigue  EYES: No eye pain, visual disturbances, or discharge  ENT:  No difficulty hearing, tinnitus, vertigo; No sinus or throat pain  NECK: No pain or stiffness  RESPIRATORY: No cough, wheezing, chills or hemoptysis; +Shortness of Breath  CARDIOVASCULAR: No chest pain, palpitations, passing out, dizziness, or leg swelling  GASTROINTESTINAL: No abdominal or epigastric pain. No nausea, vomiting, or hematemesis; No diarrhea or constipation. No melena or hematochezia.  GENITOURINARY: No dysuria, frequency, hematuria, or incontinence  NEUROLOGICAL: No headaches, memory loss, loss of strength, numbness, or tremors  SKIN: No itching, burning, rashes, or lesions   LYMPH Nodes: No enlarged glands  ENDOCRINE: No heat or cold intolerance; No hair loss  MUSCULOSKELETAL: No joint pain or swelling; No muscle, back, or extremity pain  PSYCHIATRIC: No depression, anxiety, mood swings, or difficulty sleeping  HEME/LYMPH: No easy bruising, or bleeding gums  ALLERGY AND IMMUNOLOGIC: No hives or eczema	    [ ] All others negative	  [ ] Unable to obtain    PHYSICAL EXAM:  T(C): 36.7 (12-31-21 @ 11:59), Max: 36.7 (12-31-21 @ 11:59)  HR: 66 (12-31-21 @ 11:59) (66 - 80)  BP: 107/72 (12-31-21 @ 11:59) (107/72 - 110/81)  RR: 18 (12-31-21 @ 11:59) (18 - 18)  SpO2: 99% (12-31-21 @ 11:59) (97% - 99%)  Wt(kg): --  I&O's Summary    30 Dec 2021 07:01  -  31 Dec 2021 07:00  --------------------------------------------------------  IN: 120 mL / OUT: 0 mL / NET: 120 mL    31 Dec 2021 07:01  -  31 Dec 2021 14:24  --------------------------------------------------------  IN: 280 mL / OUT: 0 mL / NET: 280 mL        Appearance: Normal	  HEENT:   Normal oral mucosa, PERRL, EOMI	  Lymphatic: No lymphadenopathy  Cardiovascular: Normal S1 S2, No JVD, + murmurs, No edema  Respiratory: rhonchi  Psychiatry: A & O x 3, Mood & affect appropriate  Gastrointestinal:  Soft, Non-tender, + BS	  Skin: No rashes, No ecchymoses, No cyanosis	  Neurologic: Non-focal  Extremities: Normal range of motion, No clubbing, cyanosis or edema  Vascular: Peripheral pulses palpable 2+ bilaterally    TELEMETRY: 	    ECG:  	  RADIOLOGY:  OTHER: 	  	  LABS:	 	    CARDIAC MARKERS:                              14.2   7.82  )-----------( 124      ( 31 Dec 2021 05:17 )             45.0     12-31    139  |  107  |  26<H>  ----------------------------<  102<H>  4.2   |  20<L>  |  1.40<H>    Ca    9.3      31 Dec 2021 05:17  Phos  3.6     12-30  Mg     2.2     12-30    TPro  6.3  /  Alb  3.5  /  TBili  1.4<H>  /  DBili  x   /  AST  33  /  ALT  43  /  AlkPhos  167<H>  12-31    proBNP:   Lipid Profile:   HgA1c:   TSH: Thyroid Stimulating Hormone, Serum: 3.03 uIU/mL (12-31 @ 08:46)  - Pt found to have multiple LV thrombi on TTE  - Continue heparin infusion with plan to transition to Warfarin.   - TTE shows severely reduced LVEF at 15% with moderate MR, and severe LV and RV dysfunction.   - Given patients AMS/ dementia and advanced age, pt is currently not a candidate for ischemic evaluation with catheterization. Will continue to medically manage Mr. Wayne.     PREVIOUS DIAGNOSTIC TESTING:    < from: TTE with Doppler (w/Cont) (12.30.21 @ 10:44) >  1. Tethered mitral valve leaflets with normal opening.  Moderate mitral regurgitation.  2. Severe global left ventricular systolic dysfunction.  Endocardial visualization enhanced with intravenous  injection of Ultrasonic Enhancing Agent (Definity). At  least 2 Left ventricular thrombi seen in apex. The largest  measures 1.7 cm x 1.6cm.  3. Right ventricular enlargement with decreased right  ventricular systolic function.  4. Normal tricuspid valve. Moderate tricuspid  regurgitation.  5. Estimated pulmonary artery systolic pressure equals 57  mm Hg, assuming right atrial pressure equals 8 mm Hg,  consistent with moderate pulmonary pressures.  6. Agitated saline injection and color flow Doppler  demonstrates no evidence of a patent foramen ovale.    < from: 12 Lead ECG (12.29.21 @ 23:53) >  Diagnosis Line NORMAL SINUS RHYTHM with frequent PVCs  INCOMPLETE LEFT BUNDLE BRANCH BLOCK  NONSPECIFIC ST AND T WAVE ABNORMALITY  PROLONGED QT  ABNORMAL ECG  WHEN COMPARED WITH ECG OF 29-DEC-2021 23:51, (UNCONFIRMED)  NO SIGNIFICANT CHANGE WAS FOUND    < from: CT Angio Chest PE Protocol w/ IV Cont (12.30.21 @ 02:45) >  Limited study due to respiratory motion.  No pulmonary embolism though evaluation of subsegmental pulmonary   arteries is limited secondary to respiratory motion artifact.  Small bilateral pleural effusions with adjacent mild compressive   atelectasis.    < from: VA Duplex Lower Ext Vein Scan, Bilat (12.30.21 @ 05:45) >  Nonocclusive thrombus below the knee in the left leg of indeterminate age.    < end of copied text >

## 2021-12-31 NOTE — PROGRESS NOTE ADULT - SUBJECTIVE AND OBJECTIVE BOX
afebrile    REVIEW OF SYSTEMS:  GEN: no fever,    no chills  RESP: no SOB,   no cough  CVS: no chest pain,   no palpitations  GI: no abdominal pain,   no nausea,   no vomiting,   no constipation,   no diarrhea  : no dysuria,   no frequency  NEURO: no headache,   no dizziness  PSYCH: no depression,   not anxious  Derm : no rash    MEDICATIONS  (STANDING):  furosemide   Injectable 40 milliGRAM(s) IV Push daily  heparin  Infusion. 1000 Unit(s)/Hr (10 mL/Hr) IV Continuous <Continuous>  metoprolol succinate ER 25 milliGRAM(s) Oral daily    MEDICATIONS  (PRN):  heparin   Injectable 7000 Unit(s) IV Push every 6 hours PRN For aPTT less than 40  heparin   Injectable 3500 Unit(s) IV Push every 6 hours PRN For aPTT between 40 - 57      Vital Signs Last 24 Hrs  T(C): 36.3 (31 Dec 2021 04:40), Max: 36.3 (31 Dec 2021 04:40)  T(F): 97.3 (31 Dec 2021 04:40), Max: 97.3 (31 Dec 2021 04:40)  HR: 69 (31 Dec 2021 04:40) (57 - 69)  BP: 107/78 (31 Dec 2021 04:40) (107/78 - 111/75)  BP(mean): 87 (30 Dec 2021 12:07) (87 - 87)  RR: 18 (31 Dec 2021 04:40) (16 - 18)  SpO2: 99% (31 Dec 2021 04:40) (95% - 99%)  CAPILLARY BLOOD GLUCOSE        I&O's Summary    30 Dec 2021 07:01  -  31 Dec 2021 07:00  --------------------------------------------------------  IN: 120 mL / OUT: 0 mL / NET: 120 mL        PHYSICAL EXAM:  HEAD:  Atraumatic, Normocephalic  NECK: Supple, No   JVD  CHEST/LUNG:   no     rales,     no,    rhonchi  HEART: Regular rate and rhythm;         murmur  ABDOMEN: Soft, Nontender, ;   EXTREMITIES:        edema  NEUROLOGY:  alert    LABS:                        14.2   7.82  )-----------( 124      ( 31 Dec 2021 05:17 )             45.0     12-31    139  |  107  |  26<H>  ----------------------------<  102<H>  4.2   |  20<L>  |  1.40<H>    Ca    9.3      31 Dec 2021 05:17  Phos  3.6     12-30  Mg     2.2     12-30    TPro  6.3  /  Alb  3.5  /  TBili  1.4<H>  /  DBili  x   /  AST  33  /  ALT  43  /  AlkPhos  167<H>  12-31    PT/INR - ( 30 Dec 2021 01:37 )   PT: 14.9 sec;   INR: 1.26 ratio         PTT - ( 31 Dec 2021 05:17 )  PTT:77.9 sec        Lactate, Blood: 1.9 mmol/L (12-30 @ 01:36)                  Consultant(s) Notes Reviewed:      Care Discussed with Consultants/Other Providers:       afebrile/ non verbal / calm now    REVIEW OF SYSTEMS:  GEN: no fever,    no chills  RESP: no SOB,   no cough  CVS: no chest pain,   no palpitations  GI: no abdominal pain,   no nausea,   no vomiting,   no constipation,   no diarrhea  : no dysuria,   no frequency  NEURO: no headache,   no dizziness  PSYCH: no depression,   not anxious  Derm : no rash    MEDICATIONS  (STANDING):  furosemide   Injectable 40 milliGRAM(s) IV Push daily  heparin  Infusion. 1000 Unit(s)/Hr (10 mL/Hr) IV Continuous <Continuous>  metoprolol succinate ER 25 milliGRAM(s) Oral daily    MEDICATIONS  (PRN):  heparin   Injectable 7000 Unit(s) IV Push every 6 hours PRN For aPTT less than 40  heparin   Injectable 3500 Unit(s) IV Push every 6 hours PRN For aPTT between 40 - 57      Vital Signs Last 24 Hrs  T(C): 36.3 (31 Dec 2021 04:40), Max: 36.3 (31 Dec 2021 04:40)  T(F): 97.3 (31 Dec 2021 04:40), Max: 97.3 (31 Dec 2021 04:40)  HR: 69 (31 Dec 2021 04:40) (57 - 69)  BP: 107/78 (31 Dec 2021 04:40) (107/78 - 111/75)  BP(mean): 87 (30 Dec 2021 12:07) (87 - 87)  RR: 18 (31 Dec 2021 04:40) (16 - 18)  SpO2: 99% (31 Dec 2021 04:40) (95% - 99%)  CAPILLARY BLOOD GLUCOSE        I&O's Summary    30 Dec 2021 07:01  -  31 Dec 2021 07:00  --------------------------------------------------------  IN: 120 mL / OUT: 0 mL / NET: 120 mL        PHYSICAL EXAM:  HEAD:  Atraumatic, Normocephalic  NECK: Supple, No   JVD  CHEST/LUNG:   no     rales,     no,    rhonchi  HEART: Regular rate and rhythm;         murmur  ABDOMEN: Soft, Nontender, ;   EXTREMITIES:     no   edema  NEUROLOGY:   dementia    LABS:                        14.2   7.82  )-----------( 124      ( 31 Dec 2021 05:17 )             45.0     12-31    139  |  107  |  26<H>  ----------------------------<  102<H>  4.2   |  20<L>  |  1.40<H>    Ca    9.3      31 Dec 2021 05:17  Phos  3.6     12-30  Mg     2.2     12-30    TPro  6.3  /  Alb  3.5  /  TBili  1.4<H>  /  DBili  x   /  AST  33  /  ALT  43  /  AlkPhos  167<H>  12-31    PT/INR - ( 30 Dec 2021 01:37 )   PT: 14.9 sec;   INR: 1.26 ratio         PTT - ( 31 Dec 2021 05:17 )  PTT:77.9 sec        Lactate, Blood: 1.9 mmol/L (12-30 @ 01:36)                  Consultant(s) Notes Reviewed:      Care Discussed with Consultants/Other Providers:

## 2021-12-31 NOTE — PROGRESS NOTE ADULT - SUBJECTIVE AND OBJECTIVE BOX
Cardiovascular Disease Progress Note    Overnight events: Pt seen and examined at bedside. Currently Mr. Black is resting comfortably in bed. ROS unable to be obtained 2/2 dementia. Overnight pt has a run of NSVT. Mr. Black was asymptomatic and hemodynamically stable during episode.    Otherwise review of systems negative    Objective Findings:  T(C): 36.4 (21 @ 10:43), Max: 36.4 (21 @ 10:43)  HR: 80 (21 @ 10:43) (57 - 80)  BP: 110/81 (21 @ 10:43) (107/78 - 111/75)  RR: 18 (21 @ 10:43) (16 - 18)  SpO2: 97% (21 @ 10:43) (95% - 99%)  Wt(kg): --  Daily Height in cm: 172.72 (30 Dec 2021 12:07)    Daily Weight in k (31 Dec 2021 08:28)      Physical Exam:  Gen: NAD; Patient resting comfortably. Cachectic.   HEENT: EOMI, Normocephalic/ atraumatic  CV: RRR, normal S1 + S2, no m/r/g  Lungs:  Normal respiratory effort; clear to auscultation bilaterally  Abd: soft, non-tender; bowel sounds present  Ext: No edema; warm and well perfused    Telemetry: Sinus rhythm with PVCs    Laboratory Data:                        14.2   7.82  )-----------( 124      ( 31 Dec 2021 05:17 )             45.0         139  |  107  |  26<H>  ----------------------------<  102<H>  4.2   |  20<L>  |  1.40<H>    Ca    9.3      31 Dec 2021 05:17  Phos  3.6       Mg     2.2         TPro  6.3  /  Alb  3.5  /  TBili  1.4<H>  /  DBili  x   /  AST  33  /  ALT  43  /  AlkPhos  167<H>      PT/INR - ( 30 Dec 2021 01:37 )   PT: 14.9 sec;   INR: 1.26 ratio         PTT - ( 31 Dec 2021 05:17 )  PTT:77.9 sec          Inpatient Medications:  MEDICATIONS  (STANDING):  furosemide   Injectable 40 milliGRAM(s) IV Push daily  heparin  Infusion. 1000 Unit(s)/Hr (10 mL/Hr) IV Continuous <Continuous>  metoprolol succinate ER 25 milliGRAM(s) Oral daily      Assessment: 81 M with pmhx of HTN HLD PVD. 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  BPH pw confusion.    Plan of Care:    #LV thrombus  - Pt found to have multiple LV thrombi on TTE  - Continue heparin infusion with plan to transition to Warfarin.   - TTE shows severely reduced LVEF at 15% with moderate MR, and severe LV and RV dysfunction.   - Given patients AMS/ dementia and advanced age, pt is currently not a candidate for ischemic evaluation with catheterization. Will continue to medically manage Mr. Black.   - Pt DNR    #Acute decompensated systolic heart failure  - BNP elevated  - IV diuresis   - Monitor I and O, replenish lytes as needed  - Troponins elevated 2/2 demand ischemia  - Medical management at this time.     #Acute renal failure  - Unknown baseline.   - Management as per nephro    #LLE DVT  - IV heparin  - Would consider heme/onc input for hypercoagulable workup given history of DVTs in the past     #HTN  BP acceptable  - Continue BB    #HLD  Statin therapy    #PVCs  Multifocal PVCs  BB started   Monitor on Telemetry  Maintain K around 4 and Mg >2.           Over 25 minutes spent on total encounter; more than 50% of the visit was spent counseling and/or coordinating care by the attending physician.      Jacky Mccarthy D.O.   Cardiovascular Disease  [phone]

## 2021-12-31 NOTE — BH CONSULTATION LIAISON ASSESSMENT NOTE - CASE SUMMARY
This is an 81-y.o. CM patient domiciled with wife with no known pphx and pmh of dementia here for management of thrombus and CHF. Psychiatry consulted for medication recommendations for agitation.    I have seen and evaluated this patient myself. Chart, labs, meds reviewed. I agree with resident's assessment and plan.

## 2021-12-31 NOTE — CONSULT NOTE ADULT - SUBJECTIVE AND OBJECTIVE BOX
Neurology consult    JOANNA [id number]     Patient is a 81y old  Male who presents with a chief complaint of AMS, SOB  x 1 week (31 Dec 2021 14:24)      HPI:  82 yo hx htn, hld, 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  BPH pw confusion.   Obtained history from patients daughter Chika - Patient noted to have shortness of breath   noted to have dyspnea on exertion associated with cough tested for Covid negative pmd s office.     Unable to obtain review of systems due to patient condition.Patient poor historian.   Patient  s/p cataract surgery wears glasses since then.  (30 Dec 2021 12:07)      poor historian    REVIEW OF SYSTEMS:    see HPI    MEDICATIONS    furosemide   Injectable 40 milliGRAM(s) IV Push daily  heparin   Injectable 7000 Unit(s) IV Push every 6 hours PRN  heparin   Injectable 3500 Unit(s) IV Push every 6 hours PRN  heparin  Infusion. 1000 Unit(s)/Hr IV Continuous <Continuous>  metoprolol succinate ER 25 milliGRAM(s) Oral daily      PMH: History of BPH    Prostate cancer    Pulmonary embolism         PSH:     Family history: No history of dementia, strokes, or seizures   FAMILY HISTORY:      SOCIAL HISTORY:  No history of tobacco or alcohol use     Allergies    No Known Allergies    Intolerances            Vital Signs Last 24 Hrs  T(C): 36.7 (31 Dec 2021 11:59), Max: 36.7 (31 Dec 2021 11:59)  T(F): 98.1 (31 Dec 2021 11:59), Max: 98.1 (31 Dec 2021 11:59)  HR: 66 (31 Dec 2021 11:59) (66 - 80)  BP: 107/72 (31 Dec 2021 11:59) (107/72 - 110/81)  BP(mean): --  RR: 18 (31 Dec 2021 11:59) (18 - 18)  SpO2: 99% (31 Dec 2021 11:59) (97% - 99%)      On Neurological Examination:    Mental Status - lethargic opens eyes to voice oriented to self only mild dysarthria names simple objects     Cranial Nerves - PERRL, EOMI, VFF, V1-V3 intact, no gross facial asymmetry, tongue/uvula midline    Motor Exam -   no drift asterixis throughout    Sensory    Intact to light touch and pinprick bilaterally    Coord: FTN intact bilaterally     Gait -  deferred              LABS:  CBC Full  -  ( 31 Dec 2021 05:17 )  WBC Count : 7.82 K/uL  RBC Count : 4.84 M/uL  Hemoglobin : 14.2 g/dL  Hematocrit : 45.0 %  Platelet Count - Automated : 124 K/uL  Mean Cell Volume : 93.0 fl  Mean Cell Hemoglobin : 29.3 pg  Mean Cell Hemoglobin Concentration : 31.6 gm/dL  Auto Neutrophil # : x  Auto Lymphocyte # : x  Auto Monocyte # : x  Auto Eosinophil # : x  Auto Basophil # : x  Auto Neutrophil % : x  Auto Lymphocyte % : x  Auto Monocyte % : x  Auto Eosinophil % : x  Auto Basophil % : x      12-31    139  |  107  |  26<H>  ----------------------------<  102<H>  4.2   |  20<L>  |  1.40<H>    Ca    9.3      31 Dec 2021 05:17  Phos  3.6     12-30  Mg     2.2     12-30    TPro  6.3  /  Alb  3.5  /  TBili  1.4<H>  /  DBili  x   /  AST  33  /  ALT  43  /  AlkPhos  167<H>  12-31    LIVER FUNCTIONS - ( 31 Dec 2021 05:17 )  Alb: 3.5 g/dL / Pro: 6.3 g/dL / ALK PHOS: 167 U/L / ALT: 43 U/L / AST: 33 U/L / GGT: x           Hemoglobin A1C:     Vitamin B12, Serum: 1157 pg/mL (12-31 @ 08:46)    PT/INR - ( 30 Dec 2021 01:37 )   PT: 14.9 sec;   INR: 1.26 ratio         PTT - ( 31 Dec 2021 05:17 )  PTT:77.9 sec      RADIOLOGY  < from: CT Head No Cont (12.30.21 @ 02:41) >  IMPRESSION:    No acute intracranial hemorrhage, mass effect, or CT evidence of a large   vascular territory infarct. Involutional and chronic microvascular   ischemic gliotic changes.    If there are new or persistent symptoms, consider further evaluation with   MRI provided no contraindications.    < end of copied text >

## 2021-12-31 NOTE — BH CONSULTATION LIAISON ASSESSMENT NOTE - RISK ASSESSMENT
Risk factors: +confusion, agitation, acute medical problems    Protective factors: no current SIIP/HIIP, no h/o SA/SIB, no h/o psych admissions, no active substance abuse, domiciled, intact marriage, social supports    Overall, pt is at low risk of harm and does not meet criteria for psychiatric admission.

## 2021-12-31 NOTE — BH CONSULTATION LIAISON ASSESSMENT NOTE - SUMMARY
Pt is an 82 y/o male domiciled with wife with no known pphx and pmh of dementia here for management of thrombus and CHF. Psychiatry consulted for medication recommendations for agitation. On interview patient was found lying in bed with his eyes closed. Interview was limited as patient wasn't cooperative and responded to questions with non-recognizable mumbling sounds. Per patient care technician, patient has been very confused, only verbal when he wants to go to the restroom.     Collateral from patient's daughter Chika [phone]: Patient's daughter stated that patient has become progressively more forgetful during the past 2 years. For example, sometimes he will forget the names of his children. At baseline patient is very laid back and quiet. He's never been a social butterfly and is more of a listener. Patient's daughter noticed that during the past 2 weeks the patient has become more agitated, getting frustrated when he forgets things. However, prior the past few weeks, patient is typically calm. Patient's daughter denied that patient has any psychiatric history and has no history of harming himself or others. Patient's sister and both patient's have history of dementia.

## 2021-12-31 NOTE — BH CONSULTATION LIAISON ASSESSMENT NOTE - NSBHCHARTREVIEWVS_PSY_A_CORE FT
Vital Signs Last 24 Hrs  T(C): 36.7 (31 Dec 2021 11:59), Max: 36.7 (31 Dec 2021 11:59)  T(F): 98.1 (31 Dec 2021 11:59), Max: 98.1 (31 Dec 2021 11:59)  HR: 66 (31 Dec 2021 11:59) (66 - 80)  BP: 107/72 (31 Dec 2021 11:59) (107/72 - 110/81)  BP(mean): --  RR: 18 (31 Dec 2021 11:59) (18 - 18)  SpO2: 99% (31 Dec 2021 11:59) (97% - 99%)

## 2021-12-31 NOTE — BH CONSULTATION LIAISON ASSESSMENT NOTE - HPI (INCLUDE ILLNESS QUALITY, SEVERITY, DURATION, TIMING, CONTEXT, MODIFYING FACTORS, ASSOCIATED SIGNS AND SYMPTOMS)
Pt is an 80 y/o male domiciled with wife with no known pphx and pmh of dementia here for management of thrombus and CHF. Psychiatry consulted for medication recommendations for agitation. On interview patient was found lying in bed with his eyes closed. Interview was limited as patient wasn't cooperative and responded to questions with non-recognizable mumbling sounds. Per patient care technician, patient has been very confused, only verbal when he wants to go to the restroom.     Collateral from patient's daughter Chika [phone]: Patient's daughter stated that patient has become progressively more forgetful during the past 2 years. For example, sometimes he will forget the names of his children. At baseline patient is very laid back and quiet. He's never been a social butterfly and is more of a listener. Patient's daughter noticed that during the past 2 weeks the patient has become more agitated, getting frustrated when he forgets things. However, prior the past few weeks, patient is typically calm. Patient's daughter denied that patient has any psychiatric history and has no history of harming himself or others. Patient's sister and both patient's have history of dementia.

## 2021-12-31 NOTE — BH CONSULTATION LIAISON ASSESSMENT NOTE - CURRENT MEDICATION
MEDICATIONS  (STANDING):  furosemide   Injectable 40 milliGRAM(s) IV Push daily  heparin  Infusion. 1000 Unit(s)/Hr (10 mL/Hr) IV Continuous <Continuous>  metoprolol succinate ER 25 milliGRAM(s) Oral daily    MEDICATIONS  (PRN):  heparin   Injectable 7000 Unit(s) IV Push every 6 hours PRN For aPTT less than 40  heparin   Injectable 3500 Unit(s) IV Push every 6 hours PRN For aPTT between 40 - 57

## 2021-12-31 NOTE — PROGRESS NOTE ADULT - ASSESSMENT
81 M     h/o  HTN HLD PVD. 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  BPH   p/w confusion.     * LV thrombi on echo  LV  on iv heparin   echo, 15% with moderate MR, and severe LV and RV dysfunction.   - Given patients AMS/ dementia and advanced age, pt is currently not a candidate for ischemic evaluation with catheterization. Will proceed with medical therapy and will continue to reassess patient for improvement in mental status.     #Acute decompensated systolic heart failure  - BNP elevated  - IV diuresis   - Monitor I and O, replenish lytes as needed  - Troponins elevated 2/2 demand ischemia  - Medical management at this time.     #Acute renal failure  - Unknown baseline.   - Management as per nephro    #LLE DVT  - IV heparin  - Would consider heme/onc input for hypercoagulable workup given history of DVTs in the past     #HTN  BP acceptable  - Pt not on medications for htn    #HLD  Statin therapy    #ACP (advance care planning)-  Advanced care planning was addressed. At this time, we will continue to monitor patient and diurese.  Will consider catheterization if mentation improves.  Risks, benefits and alternatives of medical treatment and procedures were discussed in detail and all questions were answered. 30 minutes spent addressing advance care plans.       81 M     h/o  HTN HLD PVD.   DVT/PE  in 2004  and  in 2005,  no longer on AC   TIA, CVA.  BPH, dementia   p/w confusion.     * LV thrombi on echo  LV  on iv heparin    * cardiomyopathy,  echo, 15% with moderate MR, and severe LV and RV dysfunction.   -per  card, Given patients AMS/ dementia and advanced age, pt is currently not a candidate for ischemic evaluation with catheterization./ on me d rx  *  Acute decompensated systolic heart failure  on iv lasix  -*  elevated , Troponins elevated 2/2 demand ischemia/ chf.  and not form mi  *  ?  ckd    baseline unknown  - Unknown baseline.   *  LLE   dvt/ of left pop and tibioperoneal V  - IV heparin   history of DVTs in the past ,  hence will  need  life long a/c  * HTN/ sbp on lower  side   *   Alzheimers  dementia  family asking for neuro eval  *  s/p WCT on tele,. on toprol   unable to titrate given low  sbp     called  Delaney, daughter .  wants   pt   dnr/dni   pmd  dr garcia. alberto. in [place]       rad< from: TTE with Doppler (w/Cont) (12.30.21 @ 10:44) >  Conclusions:  1. Tethered mitral valve leaflets with normal opening.  Moderate mitral regurgitation.  2. Severe global left ventricular systolic dysfunction.  Endocardial visualization enhanced with intravenous  injection of Ultrasonic Enhancing Agent (Definity). At  least 2 Left ventricular thrombi seen in apex. The largest  measures 1.7 cm x 1.6cm.  3. Right ventricular enlargement with decreased right  ventricular systolic function.  4. Normal tricuspid valve. Moderate tricuspid  regurgitation.  5. Estimated pulmonary artery systolic pressure equals 57  mm Hg, assuming right atrial pressure equals 8 mm Hg,  consistent with moderate pulmonary pressures.  6. Agitated saline injection and color flow Doppler  demonstrates no evidence of a patent foramen ovale.  *** No previous Echo exam.  Findings Discussed With Dr. Nelad Liriano  ------------------------------------------------------------------------  < end of copied text >       rad< from: [facility] Duplex Lower Ext Vein Scan, Bilat (12.30.21 @ 05:45) >  Deep venous thrombosis in the left poplitealvein and the tibioperoneal   trunk with partial occlusion. There is flow within the vein adjacent to   the clot. The clot does not appear to be echogenic.  Doppler examination shows flow.  Limited visualization of the left calf veins. The peroneal and soleal   veins appear patent.  IMPRESSION:  Nonocclusive thrombus below the knee in the left leg of indeterminate age.  Dr Neri was given results at the time of the study with read back   confirmation. Findings also discussed with Dr Duarte by Dr Chavez at 523   AM on 12/30/21.  --- End of Report --  < end of copied text >         81 M     h/o  HTN HLD PVD.   DVT/PE  in 2004  and  in 2005,  not on AC since   TIA, CVA.  BPH, dementia     p/w confusion.  from  alzheimers  dementia     * LV thrombi on echo   on iv heparin    * cardiomyopathy,  echo, 15% with moderate MR, and severe LV and RV dysfunction.   -per  card, Given patients AMS/ dementia and advanced age, pt is currently not a candidate for ischemic evaluation with catheterization./ on me d rx  *  Acute decompensated systolic heart failure  on iv lasix  -*  elevated , Troponin  elevated 2/2 demand ischemia/ chf.  and not form mi  *  ?  ckd    baseline unknown  *  LLE   dvt/ of left pop and tibioperoneal V  - IV heparin   history of DVTs in the past ,  hence will  need  life long a/c  * HTN/ sbp on lower  side   *   Alzheimer's   dementia  *  s/p WCT 20 betas,  on tele,. on toprol   unable to titrate given low  sbp/ ep  called     called  Delaney, daughter .  wants   pt   dnr/dni , and  [facility] filled  requesting hospice eval  for services  pmd  dr garcia. alberto. in [place]       rad< from: TTE with Doppler (w/Cont) (12.30.21 @ 10:44) >  Conclusions:  1. Tethered mitral valve leaflets with normal opening.  Moderate mitral regurgitation.  2. Severe global left ventricular systolic dysfunction.  Endocardial visualization enhanced with intravenous  injection of Ultrasonic Enhancing Agent (Definity). At  least 2 Left ventricular thrombi seen in apex. The largest  measures 1.7 cm x 1.6cm.  3. Right ventricular enlargement with decreased right  ventricular systolic function.  4. Normal tricuspid valve. Moderate tricuspid  regurgitation.  5. Estimated pulmonary artery systolic pressure equals 57  mm Hg, assuming right atrial pressure equals 8 mm Hg,  consistent with moderate pulmonary pressures.  6. Agitated saline injection and color flow Doppler  demonstrates no evidence of a patent foramen ovale.  *** No previous Echo exam.  Findings Discussed With Dr. Nelda Liriano  ------------------------------------------------------------------------  < end of copied text >       rad< from: [facility] Duplex Lower Ext Vein Scan, Bilat (12.30.21 @ 05:45) >  Deep venous thrombosis in the left poplitealvein and the tibioperoneal   trunk with partial occlusion. There is flow within the vein adjacent to   the clot. The clot does not appear to be echogenic.  Doppler examination shows flow.  Limited visualization of the left calf veins. The peroneal and soleal   veins appear patent.  IMPRESSION:  Nonocclusive thrombus below the knee in the left leg of indeterminate age.  Dr Neri was given results at the time of the study with read back   confirmation. Findings also discussed with Dr Duarte by Dr Chavez at 523   AM on 12/30/21.  --- End of Report --  < end of copied text >         81 M     h/o  HTN HLD PVD.   DVT/PE  in 2004  and  in 2005,  not on AC since   TIA, CVA.  BPH, dementia     p/w confusion.  from  alzheimers  dementia     * LV thrombi on echo   on iv heparin    * cardiomyopathy,  echo, 15% with moderate MR, and severe LV and RV dysfunction.   -per  card, Given patients AMS/ dementia and advanced age, pt is currently not a candidate for ischemic evaluation with catheterization./ on me d rx  *  Acute decompensated systolic heart failure  on iv lasix  -*  elevated , Troponin  elevated 2/2 demand ischemia/ chf.  and not form mi  *  ?  ckd    baseline unknown  *  LLE   dvt/ of left pop and tibioperoneal V  - IV heparin   history of DVTs in the past ,  hence will  need  life long a/c  * HTN/ sbp on lower  side   *   Alzheimer's   dementia    with advanced   dementia   non verbal ha s 1to 1  now/ psych eval  *  s/p WCT 20 betas,  on tele,. on toprol   unable to titrate given low  sbp/ ep  called     called  Delaney, daughter .  wants   pt   dnr/dni , and  [facility] filled  requesting hospice eval  for services  [place] [facility] with Doppler (w/Cont) (12.30.21 @ 10:44) >  Conclusions:  1. Tethered mitral valve leaflets with normal opening.  Moderate mitral regurgitation.  2. Severe global left ventricular systolic dysfunction.  Endocardial visualization enhanced with intravenous  injection of Ultrasonic Enhancing Agent (Definity). At  least 2 Left ventricular thrombi seen in apex. The largest  measures 1.7 cm x 1.6cm.  3. Right ventricular enlargement with decreased right  ventricular systolic function.  4. Normal tricuspid valve. Moderate tricuspid  regurgitation.  5. Estimated pulmonary artery systolic pressure equals 57  mm Hg, assuming right atrial pressure equals 8 mm Hg,  consistent with moderate pulmonary pressures.  6. Agitated saline injection and color flow Doppler  demonstrates no evidence of a patent foramen ovale.  *** No previous Echo exam.  Findings Discussed With Dr. Nelda Liriano  ------------------------------------------------------------------------  < end of copied text >       rad< from: [facility] Duplex Lower Ext Vein Scan, Bilat (12.30.21 @ 05:45) >  Deep venous thrombosis in the left poplitealvein and the tibioperoneal   trunk with partial occlusion. There is flow within the vein adjacent to   the clot. The clot does not appear to be echogenic.  Doppler examination shows flow.  Limited visualization of the left calf veins. The peroneal and soleal   veins appear patent.  IMPRESSION:  Nonocclusive thrombus below the knee in the left leg of indeterminate age.  Dr Neri was given results at the time of the study with read back   confirmation. Findings also discussed with Dr Duarte by Dr Chavez at 523   AM on 12/30/21.  --- End of Report --  < end of copied text >

## 2021-12-31 NOTE — BH CONSULTATION LIAISON ASSESSMENT NOTE - NSBHCHARTREVIEWLAB_PSY_A_CORE FT
14.2   7.82  )-----------( 124      ( 31 Dec 2021 05:17 )             45.0   12-31    139  |  107  |  26<H>  ----------------------------<  102<H>  4.2   |  20<L>  |  1.40<H>    Ca    9.3      31 Dec 2021 05:17  Phos  3.6     12-30  Mg     2.2     12-30    TPro  6.3  /  Alb  3.5  /  TBili  1.4<H>  /  DBili  x   /  AST  33  /  ALT  43  /  AlkPhos  167<H>  12-31

## 2022-01-01 LAB
ANION GAP SERPL CALC-SCNC: 16 MMOL/L — SIGNIFICANT CHANGE UP (ref 5–17)
APTT BLD: 85.7 SEC — HIGH (ref 27.5–35.5)
BUN SERPL-MCNC: 30 MG/DL — HIGH (ref 7–23)
CALCIUM SERPL-MCNC: 9.4 MG/DL — SIGNIFICANT CHANGE UP (ref 8.4–10.5)
CHLORIDE SERPL-SCNC: 104 MMOL/L — SIGNIFICANT CHANGE UP (ref 96–108)
CO2 SERPL-SCNC: 22 MMOL/L — SIGNIFICANT CHANGE UP (ref 22–31)
CREAT SERPL-MCNC: 1.42 MG/DL — HIGH (ref 0.5–1.3)
GLUCOSE SERPL-MCNC: 138 MG/DL — HIGH (ref 70–99)
HCT VFR BLD CALC: 44.1 % — SIGNIFICANT CHANGE UP (ref 39–50)
HGB BLD-MCNC: 13.9 G/DL — SIGNIFICANT CHANGE UP (ref 13–17)
MCHC RBC-ENTMCNC: 28.8 PG — SIGNIFICANT CHANGE UP (ref 27–34)
MCHC RBC-ENTMCNC: 31.5 GM/DL — LOW (ref 32–36)
MCV RBC AUTO: 91.5 FL — SIGNIFICANT CHANGE UP (ref 80–100)
NRBC # BLD: 0 /100 WBCS — SIGNIFICANT CHANGE UP (ref 0–0)
PLATELET # BLD AUTO: 226 K/UL — SIGNIFICANT CHANGE UP (ref 150–400)
POTASSIUM SERPL-MCNC: 3.8 MMOL/L — SIGNIFICANT CHANGE UP (ref 3.5–5.3)
POTASSIUM SERPL-SCNC: 3.8 MMOL/L — SIGNIFICANT CHANGE UP (ref 3.5–5.3)
RBC # BLD: 4.82 M/UL — SIGNIFICANT CHANGE UP (ref 4.2–5.8)
RBC # FLD: 13.3 % — SIGNIFICANT CHANGE UP (ref 10.3–14.5)
SARS-COV-2 RNA SPEC QL NAA+PROBE: SIGNIFICANT CHANGE UP
SODIUM SERPL-SCNC: 142 MMOL/L — SIGNIFICANT CHANGE UP (ref 135–145)
WBC # BLD: 8.32 K/UL — SIGNIFICANT CHANGE UP (ref 3.8–10.5)
WBC # FLD AUTO: 8.32 K/UL — SIGNIFICANT CHANGE UP (ref 3.8–10.5)

## 2022-01-01 PROCEDURE — 93010 ELECTROCARDIOGRAM REPORT: CPT

## 2022-01-01 RX ORDER — AMIODARONE HYDROCHLORIDE 400 MG/1
400 TABLET ORAL
Refills: 0 | Status: DISCONTINUED | OUTPATIENT
Start: 2022-01-01 | End: 2022-01-05

## 2022-01-01 RX ADMIN — Medication 100 MILLIGRAM(S): at 05:26

## 2022-01-01 RX ADMIN — Medication 40 MILLIGRAM(S): at 05:26

## 2022-01-01 RX ADMIN — HEPARIN SODIUM 1000 UNIT(S)/HR: 5000 INJECTION INTRAVENOUS; SUBCUTANEOUS at 06:30

## 2022-01-01 RX ADMIN — Medication 3 MILLIGRAM(S): at 22:13

## 2022-01-01 RX ADMIN — QUETIAPINE FUMARATE 25 MILLIGRAM(S): 200 TABLET, FILM COATED ORAL at 22:13

## 2022-01-01 RX ADMIN — Medication 25 MILLIGRAM(S): at 05:26

## 2022-01-01 RX ADMIN — AMIODARONE HYDROCHLORIDE 400 MILLIGRAM(S): 400 TABLET ORAL at 17:22

## 2022-01-01 NOTE — PROGRESS NOTE ADULT - SUBJECTIVE AND OBJECTIVE BOX
Cardiovascular Disease Progress Note    Overnight events: Pt seen and examined at bedside. Mr. Black is more alert today but still remains confused AA0x1. Overnight pt went into afib. Rate controlled.   Otherwise review of systems negative    Objective Findings:  T(C): 36.7 (01-01-22 @ 11:11), Max: 36.7 (12-31-21 @ 11:59)  HR: 69 (01-01-22 @ 11:22) (54 - 89)  BP: 116/71 (01-01-22 @ 11:22) (107/72 - 121/86)  RR: 18 (01-01-22 @ 11:11) (18 - 18)  SpO2: 96% (01-01-22 @ 11:22) (96% - 99%)  Wt(kg): --  Daily     Daily       Physical Exam:  Gen: NAD; Patient resting comfortably  HEENT: EOMI, Normocephalic/ atraumatic  CV: RRR, normal S1 + S2, no m/r/g  Lungs:  Normal respiratory effort; clear to auscultation bilaterally  Abd: soft, non-tender; bowel sounds present  Ext: No edema; warm and well perfused    Telemetry: Afib with occasional PVCs    Laboratory Data:                        13.9   8.32  )-----------( 226      ( 01 Jan 2022 06:14 )             44.1     01-01    142  |  104  |  30<H>  ----------------------------<  138<H>  3.8   |  22  |  1.42<H>    Ca    9.4      01 Jan 2022 06:14  Phos  3.6     12-30  Mg     2.2     12-30    TPro  6.3  /  Alb  3.5  /  TBili  1.4<H>  /  DBili  x   /  AST  33  /  ALT  43  /  AlkPhos  167<H>  12-31    PTT - ( 01 Jan 2022 06:14 )  PTT:85.7 sec          Inpatient Medications:  MEDICATIONS  (STANDING):  aMIOdarone    Tablet 400 milliGRAM(s) Oral two times a day  furosemide   Injectable 40 milliGRAM(s) IV Push daily  heparin  Infusion. 1000 Unit(s)/Hr (10 mL/Hr) IV Continuous <Continuous>  melatonin 3 milliGRAM(s) Oral at bedtime  metoprolol succinate ER 25 milliGRAM(s) Oral daily  QUEtiapine 25 milliGRAM(s) Oral at bedtime      Assessment: 81 M with pmhx of HTN HLD PVD. 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  BPH pw confusion.    Plan of Care:    #LV thrombus  - Pt found to have multiple LV thrombi on TTE  - Continue heparin infusion with plan to transition to Warfarin.   - TTE shows severely reduced LVEF at 15% with moderate MR, and severe LV and RV dysfunction.   - Given patients AMS/ dementia and advanced age, pt is currently not a candidate for ischemic evaluation with catheterization. Will continue to medically manage Mr. Black.   - Pt DNR    #Acute decompensated systolic heart failure  - BNP elevated  - IV diuresis. Will transition to PO tomorrow.   - Monitor I and O, replenish lytes as needed  - Troponins elevated 2/2 demand ischemia  - Medical management at this time.     #Acute renal failure  - Unknown baseline.   - Management as per nephro    #LLE DVT  - IV heparin  - Would consider heme/onc input for hypercoagulable workup given history of DVTs in the past     #HTN  BP acceptable  - Continue BB    #HLD  Statin therapy    #PVCs  Multifocal PVCs  BB started  Started on amiodarone   Maintain K around 4 and Mg >2.   Not on AICD candidate.   EP input reviewed and appreciated.             Over 25 minutes spent on total encounter; more than 50% of the visit was spent counseling and/or coordinating care by the attending physician.      Jacky Mccarthy D.O.   Cardiovascular Disease  [phone]

## 2022-01-01 NOTE — PROGRESS NOTE ADULT - SUBJECTIVE AND OBJECTIVE BOX
afebrile    REVIEW OF SYSTEMS:  GEN: no fever,    no chills  RESP: no SOB,   no cough  CVS: no chest pain,   no palpitations  GI: no abdominal pain,   no nausea,   no vomiting,   no constipation,   no diarrhea  : no dysuria,   no frequency  NEURO: no headache,   no dizziness  PSYCH: no depression,   not anxious  Derm : no rash    MEDICATIONS  (STANDING):  furosemide   Injectable 40 milliGRAM(s) IV Push daily  heparin  Infusion. 1000 Unit(s)/Hr (10 mL/Hr) IV Continuous <Continuous>  melatonin 3 milliGRAM(s) Oral at bedtime  metoprolol succinate ER 25 milliGRAM(s) Oral daily  QUEtiapine 25 milliGRAM(s) Oral at bedtime    MEDICATIONS  (PRN):  heparin   Injectable 7000 Unit(s) IV Push every 6 hours PRN For aPTT less than 40  heparin   Injectable 3500 Unit(s) IV Push every 6 hours PRN For aPTT between 40 - 57      Vital Signs Last 24 Hrs  T(C): 36.3 (01 Jan 2022 05:47), Max: 36.7 (31 Dec 2021 11:59)  T(F): 97.3 (01 Jan 2022 05:47), Max: 98.1 (31 Dec 2021 11:59)  HR: 89 (01 Jan 2022 05:47) (54 - 89)  BP: 119/58 (01 Jan 2022 05:47) (107/72 - 121/86)  BP(mean): --  RR: 18 (01 Jan 2022 05:47) (18 - 18)  SpO2: 99% (01 Jan 2022 05:47) (96% - 99%)  CAPILLARY BLOOD GLUCOSE        I&O's Summary    31 Dec 2021 07:01  -  01 Jan 2022 07:00  --------------------------------------------------------  IN: 600 mL / OUT: 300 mL / NET: 300 mL        PHYSICAL EXAM:  HEAD:  Atraumatic, Normocephalic  NECK: Supple, No   JVD  CHEST/LUNG:   no     rales,     no,    rhonchi  HEART: Regular rate and rhythm;         murmur  ABDOMEN: Soft, Nontender, ;   EXTREMITIES:   no     edema  NEUROLOGY:   dementia    LABS:                        13.9   8.32  )-----------( 226      ( 01 Jan 2022 06:14 )             44.1     01-01    142  |  104  |  30<H>  ----------------------------<  138<H>  3.8   |  22  |  1.42<H>    Ca    9.4      01 Jan 2022 06:14  Phos  3.6     12-30  Mg     2.2     12-30    TPro  6.3  /  Alb  3.5  /  TBili  1.4<H>  /  DBili  x   /  AST  33  /  ALT  43  /  AlkPhos  167<H>  12-31    PTT - ( 01 Jan 2022 06:14 )  PTT:85.7 sec                Thyroid Stimulating Hormone, Serum: 3.03 uIU/mL (12-31 @ 08:46)          Consultant(s) Notes Reviewed:      Care Discussed with Consultants/Other Providers:

## 2022-01-01 NOTE — CONSULT NOTE ADULT - ASSESSMENT
82 yo hx htn, hld, 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  BPH pw confusion. Obtained history from patients daughter Chika - Patient noted to have shortness of breath   noted to have dyspnea on exertion associated with cough tested for Covid negative pmd s office. Unable to obtain review of systems due to patient condition. Patient poor historian.   Patient  s/p cataract surgery wears glasses since then.  pt with severe LV dysfunction etiology, ischemia needs to be ruled out not a candidate for aggressive measures  continue beta blocker  will add amio if recurrent WCT  keep k>4  continue iv heparin  not a candidate for AICD  tsh  magnesium level  fu lytes closely  
82 yo hx htn, hld, 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  BPH pw confusion in setting of dsypnea, + DVT on AC, LV dysfunction PE advanced cognitive deficits, lethargy asterixis CTH no acute finding      Impression: AMS with asterixis in setting of illness likely TME at this point can not determine if and what extent underlying neurodegenerative process is playing a role    manage underlying medical, cardio-pulm issues per primary team  Consider ammonia, B12, folate TSH  supportive care  -No further inpatient Neurologic workup at this time
Assessment and Recommendations:  81y male w/ pmhx/ochx of htn, hld, dvt/pe (no longer on AC dxed 5-7 yrs ago), TIA, CVA, BPH, dementia admitted for confusion, LV thrombus and CHF management. 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    # ectropion both eyes  # blepharitis both eyes  - eye redness likely from ectropion and blepharitis causing dryness and crusting  - start artificial tears, one drop four times a day, to both eyes   - warm compresses BID-TID both eyes  - erythromycin ointment QHS both eyes for 7 days, then continue with lacrilube ointment QHS both eyes    Estefani Dolan MD PGY-2  [phone]  Also available on [employer] Teams    Outpatient follow-up: Patient should follow-up with his/her ophthalmologist or with [facility] Department of Ophthalmology at the address below     [address] [place] [address]. [address]  [place] [address]  [phone]

## 2022-01-01 NOTE — PROGRESS NOTE ADULT - SUBJECTIVE AND OBJECTIVE BOX
Neurology consult    JOANNA [id number]     Patient is a 81y old  Male who presents with a chief complaint of AMS, SOB  x 1 week (31 Dec 2021 14:24)      HPI:  82 yo hx htn, hld, 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  BPH pw confusion.   Obtained history from patients daughter Chika - Patient noted to have shortness of breath   noted to have dyspnea on exertion associated with cough tested for Covid negative pmd s office.     Unable to obtain review of systems due to patient condition.Patient poor historian.   Patient  s/p cataract surgery wears glasses since then.  (30 Dec 2021 12:07)    MEDICATIONS:    aMIOdarone    Tablet 400 milliGRAM(s) Oral two times a day  furosemide   Injectable 40 milliGRAM(s) IV Push daily  heparin   Injectable 7000 Unit(s) IV Push every 6 hours PRN  heparin   Injectable 3500 Unit(s) IV Push every 6 hours PRN  heparin  Infusion. 1000 Unit(s)/Hr IV Continuous <Continuous>  melatonin 3 milliGRAM(s) Oral at bedtime  metoprolol succinate ER 25 milliGRAM(s) Oral daily  QUEtiapine 25 milliGRAM(s) Oral at bedtime      LABS:                          13.9   8.32  )-----------( 226      ( 01 Jan 2022 06:14 )             44.1     01-01    142  |  104  |  30<H>  ----------------------------<  138<H>  3.8   |  22  |  1.42<H>    Ca    9.4      01 Jan 2022 06:14    TPro  6.3  /  Alb  3.5  /  TBili  1.4<H>  /  DBili  x   /  AST  33  /  ALT  43  /  AlkPhos  167<H>  12-31    CAPILLARY BLOOD GLUCOSE        PTT - ( 01 Jan 2022 06:14 )  PTT:85.7 sec    I&O's Summary    31 Dec 2021 07:01  -  01 Jan 2022 07:00  --------------------------------------------------------  IN: 600 mL / OUT: 300 mL / NET: 300 mL    01 Jan 2022 07:01  -  01 Jan 2022 22:04  --------------------------------------------------------  IN: 120 mL / OUT: 1200 mL / NET: -1080 mL      Vital Signs Last 24 Hrs  T(C): 36.7 (01 Jan 2022 11:11), Max: 36.7 (01 Jan 2022 11:11)  T(F): 98 (01 Jan 2022 11:11), Max: 98 (01 Jan 2022 11:11)  HR: 69 (01 Jan 2022 11:22) (69 - 89)  BP: 116/71 (01 Jan 2022 11:22) (116/71 - 119/58)  BP(mean): --  RR: 18 (01 Jan 2022 11:11) (18 - 18)  SpO2: 96% (01 Jan 2022 11:22) (96% - 99%)      On Neurological Examination:    Mental Status - lethargic opens eyes to voice oriented to self only mild dysarthria names simple objects     Cranial Nerves - PERRL, EOMI, VFF, V1-V3 intact, no gross facial asymmetry, tongue/uvula midline    Motor Exam -   no drift     Sensory    Intact to light touch and pinprick bilaterally    Coord: FTN intact bilaterally     Gait -  deferred     NIHSS 5         LABS:  CBC Full  -  ( 31 Dec 2021 05:17 )  WBC Count : 7.82 K/uL  RBC Count : 4.84 M/uL  Hemoglobin : 14.2 g/dL  Hematocrit : 45.0 %  Platelet Count - Automated : 124 K/uL  Mean Cell Volume : 93.0 fl  Mean Cell Hemoglobin : 29.3 pg  Mean Cell Hemoglobin Concentration : 31.6 gm/dL  Auto Neutrophil # : x  Auto Lymphocyte # : x  Auto Monocyte # : x  Auto Eosinophil # : x  Auto Basophil # : x  Auto Neutrophil % : x  Auto Lymphocyte % : x  Auto Monocyte % : x  Auto Eosinophil % : x  Auto Basophil % : x      12-31    139  |  107  |  26<H>  ----------------------------<  102<H>  4.2   |  20<L>  |  1.40<H>    Ca    9.3      31 Dec 2021 05:17  Phos  3.6     12-30  Mg     2.2     12-30    TPro  6.3  /  Alb  3.5  /  TBili  1.4<H>  /  DBili  x   /  AST  33  /  ALT  43  /  AlkPhos  167<H>  12-31    LIVER FUNCTIONS - ( 31 Dec 2021 05:17 )  Alb: 3.5 g/dL / Pro: 6.3 g/dL / ALK PHOS: 167 U/L / ALT: 43 U/L / AST: 33 U/L / GGT: x           Hemoglobin A1C:     Vitamin B12, Serum: 1157 pg/mL (12-31 @ 08:46)    PT/INR - ( 30 Dec 2021 01:37 )   PT: 14.9 sec;   INR: 1.26 ratio         PTT - ( 31 Dec 2021 05:17 )  PTT:77.9 sec      RADIOLOGY  < from: CT Head No Cont (12.30.21 @ 02:41) >  IMPRESSION:    No acute intracranial hemorrhage, mass effect, or CT evidence of a large   vascular territory infarct. Involutional and chronic microvascular   ischemic gliotic changes.    If there are new or persistent symptoms, consider further evaluation with   MRI provided no contraindications.    < end of copied text >

## 2022-01-01 NOTE — PROGRESS NOTE ADULT - ASSESSMENT
82 yo hx htn, hld, 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  BPH pw confusion in setting of dsypnea, + DVT on AC, LV dysfunction PE advanced cognitive deficits, lethargy asterixis CTH no acute finding. episode of Afib      Impression: AMS likely TME    Given newly diagnosed Afib reasonable to r/o CVA. consider MRI. if can not tolerate than a repeat CT head  manage underlying medical, cardio-pulm issues per primary team  On AC  supportive care

## 2022-01-01 NOTE — CONSULT NOTE ADULT - SUBJECTIVE AND OBJECTIVE BOX
[facility] DEPARTMENT OF OPHTHALMOLOGY - INITIAL ADULT CONSULT  -----------------------------------------------------------------------------------------------------------  Estefani Dolan MD PGY-2  [phone]  Also available on Microsoft Teams  -----------------------------------------------------------------------------------------------------------    HPI:  82 yo hx htn, hld, 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  BPH pw confusion.   Obtained history from patients daughter Chika - Patient noted to have shortness of breath   noted to have dyspnea on exertion associated with cough tested for Covid negative pmd s office.     Unable to obtain review of systems due to patient condition.Patient poor historian.   Patient  s/p cataract surgery wears glasses since then.  (30 Dec 2021 12:07)    Interval History: Ophthalmology consulted for eye redness of 1 day duration. Pt confused, AAOx1, cannot obtain history. No eye complaints. Spoke with daughter, pt has had redness of his eyes for past year, improves with AT. Has been told he has QUIQUE in the past. Last saw eye dr, Dr. Kat Mars(?), in 12/2020. Had CE/IOL OU performed 4 years ago.     PAST MEDICAL & SURGICAL HISTORY:  History of BPH    Prostate cancer    Pulmonary embolism      Past Ocular History: QUIQUE, CE/IOL OU  Ophthalmic Medications: AT prn  FAMILY HISTORY:      MEDICATIONS  (STANDING):  furosemide   Injectable 40 milliGRAM(s) IV Push daily  heparin  Infusion. 1000 Unit(s)/Hr (10 mL/Hr) IV Continuous <Continuous>  melatonin 3 milliGRAM(s) Oral at bedtime  metoprolol succinate ER 25 milliGRAM(s) Oral daily  QUEtiapine 25 milliGRAM(s) Oral at bedtime    MEDICATIONS  (PRN):  heparin   Injectable 7000 Unit(s) IV Push every 6 hours PRN For aPTT less than 40  heparin   Injectable 3500 Unit(s) IV Push every 6 hours PRN For aPTT between 40 - 57    VITALS: T(C): 36.3 (01-01-22 @ 05:47)  T(F): 97.3 (01-01-22 @ 05:47), Max: 98.1 (12-31-21 @ 11:59)  HR: 89 (01-01-22 @ 05:47) (54 - 89)  BP: 119/58 (01-01-22 @ 05:47) (107/72 - 121/86)  RR:  (18 - 18)  SpO2:  (96% - 99%)  Wt(kg): --  General: AAO x 3, appropriate mood and affect    Ophthalmology Exam:  Visual acuity (sc): at least 20/100 OU poorly cooperative  Pupils: PERRL OU, no APD  Ttono: 15, 16   Extraocular movements (EOMs): Grossly full OU, no pain, no diplopia  Confrontational Visual Field (CVF): RAJEEV poorly cooperative  Color Plates: 1/12 OU poorly cooperative    Pen Light Exam (PLE)  External: Flat OU  Lids/Lashes/Lacrimal Ducts: crusting OU. Lower lid ectropion with erythema OU  Sclera/Conjunctiva: W+Q OU  Cornea: Cl OU  Anterior Chamber: D+F OU    Iris: Flat OU  Lens: PCIOL OU    Fundus Exam: dilated with 1% tropicamide and 2.5% phenylephrine  Approval obtained from primary team for dilation  Patient aware that pupils can remained dilated for at least 4-6 hours  Exam performed with 20D lens    Vitreous: PVD OU  Disc, cup/disc: sharp and pink, 0.3 OU. Myelinated nerve fiber inferiorly OD.   Macula: wnl OU  Vessels: wnl OU  Periphery: grossly wnl OU - poorly cooperative [facility] DEPARTMENT OF OPHTHALMOLOGY - INITIAL ADULT CONSULT  -----------------------------------------------------------------------------------------------------------  Estefani Dolan MD PGY-2  [phone]  Also available on Microsoft Teams  -----------------------------------------------------------------------------------------------------------    HPI:  82 yo hx htn, hld, 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  BPH pw confusion.   Obtained history from patients daughter Chika - Patient noted to have shortness of breath   noted to have dyspnea on exertion associated with cough tested for Covid negative pmd s office.     Unable to obtain review of systems due to patient condition.Patient poor historian.   Patient  s/p cataract surgery wears glasses since then.  (30 Dec 2021 12:07)    Interval History: Ophthalmology consulted for eye redness of 1 day duration. Pt confused, AAOx1, cannot obtain history. No eye complaints. Spoke with daughter, pt has had redness of his eyes for past year, improves with AT. Has been told he has QUIQUE in the past. Last saw eye dr, Dr. Kat Mars(?), in 12/2020. Had CE/IOL OU performed 4 years ago.     PAST MEDICAL & SURGICAL HISTORY:  History of BPH    Prostate cancer    Pulmonary embolism      Past Ocular History: QUIQUE, CE/IOL OU  Ophthalmic Medications: AT prn  FAMILY HISTORY:      MEDICATIONS  (STANDING):  furosemide   Injectable 40 milliGRAM(s) IV Push daily  heparin  Infusion. 1000 Unit(s)/Hr (10 mL/Hr) IV Continuous <Continuous>  melatonin 3 milliGRAM(s) Oral at bedtime  metoprolol succinate ER 25 milliGRAM(s) Oral daily  QUEtiapine 25 milliGRAM(s) Oral at bedtime    MEDICATIONS  (PRN):  heparin   Injectable 7000 Unit(s) IV Push every 6 hours PRN For aPTT less than 40  heparin   Injectable 3500 Unit(s) IV Push every 6 hours PRN For aPTT between 40 - 57    VITALS: T(C): 36.3 (01-01-22 @ 05:47)  T(F): 97.3 (01-01-22 @ 05:47), Max: 98.1 (12-31-21 @ 11:59)  HR: 89 (01-01-22 @ 05:47) (54 - 89)  BP: 119/58 (01-01-22 @ 05:47) (107/72 - 121/86)  RR:  (18 - 18)  SpO2:  (96% - 99%)  Wt(kg): --  General: AAO x 3, appropriate mood and affect    Ophthalmology Exam:  Visual acuity (cc): at least 20/100 OU poorly cooperative  Pupils: PERRL OU, no APD  Ttono: 15, 16   Extraocular movements (EOMs): Grossly full OU, no pain, no diplopia  Confrontational Visual Field (CVF): RAJEEV poorly cooperative  Color Plates: 1/12 OU poorly cooperative    Pen Light Exam (PLE)  External: Flat OU  Lids/Lashes/Lacrimal Ducts: crusting OU. Lower lid ectropion with erythema OU  Sclera/Conjunctiva: W+Q OU  Cornea: Cl OU  Anterior Chamber: D+F OU    Iris: Flat OU  Lens: PCIOL OU    Fundus Exam: dilated with 1% tropicamide and 2.5% phenylephrine  Approval obtained from primary team for dilation  Patient aware that pupils can remained dilated for at least 4-6 hours  Exam performed with 20D lens    Vitreous: PVD OU  Disc, cup/disc: sharp and pink, 0.3 OU. Myelinated nerve fiber inferiorly OD.   Macula: wnl OU  Vessels: wnl OU  Periphery: grossly wnl OU - poorly cooperative

## 2022-01-01 NOTE — PROGRESS NOTE ADULT - SUBJECTIVE AND OBJECTIVE BOX
EP     PROGRESS  NOTE   ________________________________________________    CHIEF COMPLAINT:Patient is a 81y old  Male who presents with a chief complaint of AMS, SOB  x 1 week (01 Jan 2022 10:36)  no complain.  	  REVIEW OF SYSTEMS:  CONSTITUTIONAL: No fever, weight loss, or fatigue  EYES: No eye pain, visual disturbances, or discharge  ENT:  No difficulty hearing, tinnitus, vertigo; No sinus or throat pain  NECK: No pain or stiffness  RESPIRATORY: No cough, wheezing, chills or hemoptysis; No Shortness of Breath  CARDIOVASCULAR: No chest pain, palpitations, passing out, dizziness, or leg swelling  GASTROINTESTINAL: No abdominal or epigastric pain. No nausea, vomiting, or hematemesis; No diarrhea or constipation. No melena or hematochezia.  GENITOURINARY: No dysuria, frequency, hematuria, or incontinence  NEUROLOGICAL: No headaches, memory loss, loss of strength, numbness, or tremors  SKIN: No itching, burning, rashes, or lesions   LYMPH Nodes: No enlarged glands  ENDOCRINE: No heat or cold intolerance; No hair loss  MUSCULOSKELETAL: No joint pain or swelling; No muscle, back, or extremity pain  PSYCHIATRIC: No depression, anxiety, mood swings, or difficulty sleeping  HEME/LYMPH: No easy bruising, or bleeding gums  ALLERGY AND IMMUNOLOGIC: No hives or eczema	    [ ] All others negative	  [ ] Unable to obtain    PHYSICAL EXAM:  T(C): 36.3 (01-01-22 @ 05:47), Max: 36.7 (12-31-21 @ 11:59)  HR: 89 (01-01-22 @ 05:47) (54 - 89)  BP: 119/58 (01-01-22 @ 05:47) (107/72 - 121/86)  RR: 18 (01-01-22 @ 05:47) (18 - 18)  SpO2: 99% (01-01-22 @ 05:47) (96% - 99%)  Wt(kg): --  I&O's Summary    31 Dec 2021 07:01  -  01 Jan 2022 07:00  --------------------------------------------------------  IN: 600 mL / OUT: 300 mL / NET: 300 mL    01 Jan 2022 07:01  -  01 Jan 2022 11:00  --------------------------------------------------------  IN: 120 mL / OUT: 1200 mL / NET: -1080 mL        Appearance: Normal	  HEENT:   Normal oral mucosa, PERRL, EOMI	  Lymphatic: No lymphadenopathy  Cardiovascular: Normal S1 S2, No JVD, + murmurs, No edema  Respiratory:rhonchi  Psychiatry: A & O x 3, Mood & affect appropriate  Gastrointestinal:  Soft, Non-tender, + BS	  Skin: No rashes, No ecchymoses, No cyanosis	  Neurologic: Non-focal  Extremities: Normal range of motion, No clubbing, cyanosis or edema  Vascular: Peripheral pulses palpable 2+ bilaterally    MEDICATIONS  (STANDING):  furosemide   Injectable 40 milliGRAM(s) IV Push daily  heparin  Infusion. 1000 Unit(s)/Hr (10 mL/Hr) IV Continuous <Continuous>  melatonin 3 milliGRAM(s) Oral at bedtime  metoprolol succinate ER 25 milliGRAM(s) Oral daily  QUEtiapine 25 milliGRAM(s) Oral at bedtime      TELEMETRY: 	    ECG:  	  RADIOLOGY:  OTHER: 	  	  LABS:	 	    CARDIAC MARKERS:                                13.9   8.32  )-----------( 226      ( 01 Jan 2022 06:14 )             44.1     01-01    142  |  104  |  30<H>  ----------------------------<  138<H>  3.8   |  22  |  1.42<H>    Ca    9.4      01 Jan 2022 06:14  Phos  3.6     12-30  Mg     2.2     12-30    TPro  6.3  /  Alb  3.5  /  TBili  1.4<H>  /  DBili  x   /  AST  33  /  ALT  43  /  AlkPhos  167<H>  12-31    proBNP: Serum Pro-Brain Natriuretic Peptide: 79338 pg/mL (12-30 @ 01:37)    Lipid Profile:   HgA1c:   TSH: Thyroid Stimulating Hormone, Serum: 3.03 uIU/mL (12-31 @ 08:46)    PTT - ( 01 Jan 2022 06:14 )  PTT:85.7 sec      Assessment and plan  ---------------------------  82 yo hx htn, hld, 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  BPH pw confusion. Obtained history from patients daughter Chika - Patient noted to have shortness of breath   noted to have dyspnea on exertion associated with cough tested for Covid negative pmd s office. Unable to obtain review of systems due to patient condition. Patient poor historian.   Patient  s/p cataract surgery wears glasses since then.  pt with severe LV dysfunction etiology, ischemia needs to be ruled out not a candidate for aggressive measures  continue beta blocker  will add amio if recurrent WCT  keep k>4  continue iv heparin  not a candidate for AICD  tsh  magnesium level  fu lytes closely  tele noted, run of a,fib alst night, start on empiric AMIO

## 2022-01-01 NOTE — PROGRESS NOTE ADULT - ASSESSMENT
81 M     h/o  HTN HLD PVD.   DVT/PE  in 2004  and  in 2005,  not on AC since   TIA, CVA.  BPH, dementia     p/w confusion.  from  alzheimers  dementia     * LV thrombi on echo   on iv heparin    * cardiomyopathy,  echo, 15% with moderate MR, and severe LV and RV dysfunction.   -per  card, Given patients AMS/ dementia and advanced age, pt is currently not a candidate for ischemic evaluation with catheterization./ on me d rx  *  Acute decompensated systolic heart failure  on iv lasix  -*  elevated , Troponin  elevated 2/2 demand ischemia/ chf.  and not form mi  *  ?  ckd    baseline unknown  *  LLE   dvt/ of left pop and tibioperoneal V  - IV heparin   history of DVTs in the past ,  hence will  need  life long a/c  * HTN/ sbp on lower  side   *   Alzheimer's   dementia    with advanced   dementia   non verbal had 1to 1  now/ psych eval  *  s/p WCT 20 betas,  on tele,. on toprol   unable to titrate given low  sbp/ ep  called  *  afib  on tele,  on a/c  already  pt  with  ectropion  of  b/l lower  lids.     awaiting  hospice eval/ pt  is non verbal,  advanced   dementia ,  functional  quadriplegia     called  Delaney, daughter .  wants   pt   dnr/dni , and  Molst filled  requesting hospice eval  for services  pmd  dr garcia. [facility]< from: TTE with Doppler (w/Cont) (12.30.21 @ 10:44) >  Conclusions:  1. Tethered mitral valve leaflets with normal opening.  Moderate mitral regurgitation.  2. Severe global left ventricular systolic dysfunction.  Endocardial visualization enhanced with intravenous  injection of Ultrasonic Enhancing Agent (Definity). At  least 2 Left ventricular thrombi seen in apex. The largest  measures 1.7 cm x 1.6cm.  3. Right ventricular enlargement with decreased right  ventricular systolic function.  4. Normal tricuspid valve. Moderate tricuspid  regurgitation.  5. Estimated pulmonary artery systolic pressure equals 57  mm Hg, assuming right atrial pressure equals 8 mm Hg,  consistent with moderate pulmonary pressures.  6. Agitated saline injection and color flow Doppler  demonstrates no evidence of a patent foramen ovale.  *** No previous Echo exam.  Findings Discussed With Dr. Nelda Liriano  ------------------------------------------------------------------------  < end of copied text >       rad< from: [facility] Duplex Lower Ext Vein Scan, Bilat (12.30.21 @ 05:45) >  Deep venous thrombosis in the left poplitealvein and the tibioperoneal   trunk with partial occlusion. There is flow within the vein adjacent to   the clot. The clot does not appear to be echogenic.  Doppler examination shows flow.  Limited visualization of the left calf veins. The peroneal and soleal   veins appear patent.  IMPRESSION:  Nonocclusive thrombus below the knee in the left leg of indeterminate age.  Dr Neri was given results at the time of the study with read back   confirmation. Findings also discussed with Dr Duarte by Dr Chavez at 523   AM on 12/30/21.  --- End of Report --  < end of copied text >

## 2022-01-02 LAB
GLUCOSE BLDC GLUCOMTR-MCNC: 149 MG/DL — HIGH (ref 70–99)
HCT VFR BLD CALC: 44.7 % — SIGNIFICANT CHANGE UP (ref 39–50)
HGB BLD-MCNC: 14.5 G/DL — SIGNIFICANT CHANGE UP (ref 13–17)
MCHC RBC-ENTMCNC: 28.7 PG — SIGNIFICANT CHANGE UP (ref 27–34)
MCHC RBC-ENTMCNC: 32.4 GM/DL — SIGNIFICANT CHANGE UP (ref 32–36)
MCV RBC AUTO: 88.3 FL — SIGNIFICANT CHANGE UP (ref 80–100)
NRBC # BLD: 0 /100 WBCS — SIGNIFICANT CHANGE UP (ref 0–0)
PLATELET # BLD AUTO: 224 K/UL — SIGNIFICANT CHANGE UP (ref 150–400)
RBC # BLD: 5.06 M/UL — SIGNIFICANT CHANGE UP (ref 4.2–5.8)
RBC # FLD: 13.2 % — SIGNIFICANT CHANGE UP (ref 10.3–14.5)
WBC # BLD: 7.25 K/UL — SIGNIFICANT CHANGE UP (ref 3.8–10.5)
WBC # FLD AUTO: 7.25 K/UL — SIGNIFICANT CHANGE UP (ref 3.8–10.5)

## 2022-01-02 RX ORDER — APIXABAN 2.5 MG/1
10 TABLET, FILM COATED ORAL EVERY 12 HOURS
Refills: 0 | Status: DISCONTINUED | OUTPATIENT
Start: 2022-01-02 | End: 2022-01-06

## 2022-01-02 RX ORDER — ERYTHROMYCIN BASE 5 MG/GRAM
1 OINTMENT (GRAM) OPHTHALMIC (EYE) AT BEDTIME
Refills: 0 | Status: DISCONTINUED | OUTPATIENT
Start: 2022-01-02 | End: 2022-01-06

## 2022-01-02 RX ORDER — APIXABAN 2.5 MG/1
5 TABLET, FILM COATED ORAL EVERY 12 HOURS
Refills: 0 | Status: CANCELLED | OUTPATIENT
Start: 2022-01-09 | End: 2022-01-06

## 2022-01-02 RX ORDER — POTASSIUM CHLORIDE 20 MEQ
20 PACKET (EA) ORAL ONCE
Refills: 0 | Status: COMPLETED | OUTPATIENT
Start: 2022-01-02 | End: 2022-01-02

## 2022-01-02 RX ADMIN — APIXABAN 10 MILLIGRAM(S): 2.5 TABLET, FILM COATED ORAL at 17:13

## 2022-01-02 RX ADMIN — Medication 40 MILLIGRAM(S): at 06:08

## 2022-01-02 RX ADMIN — Medication 1 DROP(S): at 06:08

## 2022-01-02 RX ADMIN — Medication 1 DROP(S): at 17:13

## 2022-01-02 RX ADMIN — Medication 1 DROP(S): at 12:02

## 2022-01-02 RX ADMIN — AMIODARONE HYDROCHLORIDE 400 MILLIGRAM(S): 400 TABLET ORAL at 17:12

## 2022-01-02 RX ADMIN — Medication 20 MILLIEQUIVALENT(S): at 11:59

## 2022-01-02 RX ADMIN — Medication 3 MILLIGRAM(S): at 22:18

## 2022-01-02 RX ADMIN — AMIODARONE HYDROCHLORIDE 400 MILLIGRAM(S): 400 TABLET ORAL at 06:02

## 2022-01-02 RX ADMIN — Medication 1 DROP(S): at 22:18

## 2022-01-02 RX ADMIN — QUETIAPINE FUMARATE 25 MILLIGRAM(S): 200 TABLET, FILM COATED ORAL at 22:18

## 2022-01-02 RX ADMIN — Medication 25 MILLIGRAM(S): at 06:02

## 2022-01-02 NOTE — PROGRESS NOTE ADULT - SUBJECTIVE AND OBJECTIVE BOX
afberile  REVIEW OF SYSTEMS:  GEN: no fever,    no chills  RESP: no SOB,   no cough  CVS: no chest pain,   no palpitations  GI: no abdominal pain,   no nausea,   no vomiting,   no constipation,   no diarrhea  : no dysuria,   no frequency  NEURO: no headache,   no dizziness  PSYCH: no depression,   not anxious  Derm : no rash    MEDICATIONS  (STANDING):  aMIOdarone    Tablet 400 milliGRAM(s) Oral two times a day  apixaban 10 milliGRAM(s) Oral every 12 hours  artificial  tears Solution 1 Drop(s) Both EYES four times a day  erythromycin   Ointment 1 Application(s) Both EYES at bedtime  furosemide   Injectable 40 milliGRAM(s) IV Push daily  melatonin 3 milliGRAM(s) Oral at bedtime  metoprolol succinate ER 25 milliGRAM(s) Oral daily  QUEtiapine 25 milliGRAM(s) Oral at bedtime    MEDICATIONS  (PRN):      Vital Signs Last 24 Hrs  T(C): 36.3 (02 Jan 2022 04:13), Max: 36.7 (01 Jan 2022 11:11)  T(F): 97.4 (02 Jan 2022 04:13), Max: 98 (01 Jan 2022 11:11)  HR: 74 (02 Jan 2022 04:13) (69 - 74)  BP: 122/78 (02 Jan 2022 04:13) (110/68 - 122/78)  BP(mean): --  RR: 18 (02 Jan 2022 04:13) (18 - 18)  SpO2: 93% (02 Jan 2022 04:13) (93% - 96%)  CAPILLARY BLOOD GLUCOSE        I&O's Summary    01 Jan 2022 07:01  -  02 Jan 2022 07:00  --------------------------------------------------------  IN: 120 mL / OUT: 1600 mL / NET: -1480 mL        PHYSICAL EXAM:  HEAD:  Atraumatic, Normocephalic  NECK: Supple, No   JVD  CHEST/LUNG:   no     rales,     no,    rhonchi  HEART: Regular rate and rhythm;         murmur  ABDOMEN: Soft, Nontender, ;   EXTREMITIES:   no     edema  NEUROLOGY:   demengtia    LABS:                        13.9   8.32  )-----------( 226      ( 01 Jan 2022 06:14 )             44.1     01-01    142  |  104  |  30<H>  ----------------------------<  138<H>  3.8   |  22  |  1.42<H>    Ca    9.4      01 Jan 2022 06:14      PTT - ( 01 Jan 2022 06:14 )  PTT:85.7 sec                Thyroid Stimulating Hormone, Serum: 3.03 uIU/mL (12-31 @ 08:46)          Consultant(s) Notes Reviewed:      Care Discussed with Consultants/Other Providers:

## 2022-01-02 NOTE — PROGRESS NOTE ADULT - SUBJECTIVE AND OBJECTIVE BOX
[facility]  [facility]     PROGRESS  NOTE   ________________________________________________    CHIEF COMPLAINT:Patient is a 81y old  Male who presents with a chief complaint of AMS, SOB  x 1 week (02 Jan 2022 07:42)  comfortable.  	  REVIEW OF SYSTEMS:  CONSTITUTIONAL: No fever, weight loss, or fatigue  EYES: No eye pain, visual disturbances, or discharge  ENT:  No difficulty hearing, tinnitus, vertigo; No sinus or throat pain  NECK: No pain or stiffness  RESPIRATORY: No cough, wheezing, chills or hemoptysis; + Shortness of Breath  CARDIOVASCULAR: No chest pain, palpitations, passing out, dizziness, or leg swelling  GASTROINTESTINAL: No abdominal or epigastric pain. No nausea, vomiting, or hematemesis; No diarrhea or constipation. No melena or hematochezia.  GENITOURINARY: No dysuria, frequency, hematuria, or incontinence  NEUROLOGICAL: No headaches, memory loss, loss of strength, numbness, or tremors  SKIN: No itching, burning, rashes, or lesions   LYMPH Nodes: No enlarged glands  ENDOCRINE: No heat or cold intolerance; No hair loss  MUSCULOSKELETAL: No joint pain or swelling; No muscle, back, or extremity pain  PSYCHIATRIC: No depression, anxiety, mood swings, or difficulty sleeping  HEME/LYMPH: No easy bruising, or bleeding gums  ALLERGY AND IMMUNOLOGIC: No hives or eczema	    [ ] All others negative	  [ ] Unable to obtain    PHYSICAL EXAM:  T(C): 36.3 (01-02-22 @ 04:13), Max: 36.7 (01-01-22 @ 11:11)  HR: 74 (01-02-22 @ 04:13) (69 - 74)  BP: 122/78 (01-02-22 @ 04:13) (110/68 - 122/78)  RR: 18 (01-02-22 @ 04:13) (18 - 18)  SpO2: 93% (01-02-22 @ 04:13) (93% - 96%)  Wt(kg): --  I&O's Summary    01 Jan 2022 07:01  -  02 Jan 2022 07:00  --------------------------------------------------------  IN: 120 mL / OUT: 1600 mL / NET: -1480 mL        Appearance: Normal	  HEENT:   Normal oral mucosa, PERRL, EOMI	  Lymphatic: No lymphadenopathy  Cardiovascular: Normal S1 S2, No JVD, + murmurs, No edema  Respiratory: Lungs clear to auscultation	  Psychiatry: A & O x 3, Mood & affect appropriate  Gastrointestinal:  Soft, Non-tender, + BS	  Skin: No rashes, No ecchymoses, No cyanosis	  Neurologic: Non-focal  Extremities: Normal range of motion, No clubbing, cyanosis or edema  Vascular: Peripheral pulses palpable 2+ bilaterally    MEDICATIONS  (STANDING):  aMIOdarone    Tablet 400 milliGRAM(s) Oral two times a day  apixaban 10 milliGRAM(s) Oral every 12 hours  artificial  tears Solution 1 Drop(s) Both EYES four times a day  erythromycin   Ointment 1 Application(s) Both EYES at bedtime  furosemide   Injectable 40 milliGRAM(s) IV Push daily  melatonin 3 milliGRAM(s) Oral at bedtime  metoprolol succinate ER 25 milliGRAM(s) Oral daily  QUEtiapine 25 milliGRAM(s) Oral at bedtime      TELEMETRY: 	    ECG:  	  RADIOLOGY:  OTHER: 	  	  LABS:	 	    CARDIAC MARKERS:                                13.9   8.32  )-----------( 226      ( 01 Jan 2022 06:14 )             44.1     01-01    142  |  104  |  30<H>  ----------------------------<  138<H>  3.8   |  22  |  1.42<H>    Ca    9.4      01 Jan 2022 06:14      proBNP: Serum Pro-Brain Natriuretic Peptide: 45257 pg/mL (12-30 @ 01:37)    Lipid Profile:   HgA1c:   TSH: Thyroid Stimulating Hormone, Serum: 3.03 uIU/mL (12-31 @ 08:46)    < from: TTE with Doppler (w/Cont) (12.30.21 @ 10:44) >  1. Tethered mitral valve leaflets with normal opening.  Moderate mitral regurgitation.  2. Severe global left ventricular systolic dysfunction.  Endocardial visualization enhanced with intravenous  injection of Ultrasonic Enhancing Agent (Definity). At  least 2 Left ventricular thrombi seen in apex. The largest  measures 1.7 cm x 1.6cm.  3. Right ventricular enlargement with decreased right  ventricular systolic function.  4. Normal tricuspid valve. Moderate tricuspid  regurgitation.  5. Estimated pulmonary artery systolic pressure equals 57  mm Hg, assuming right atrial pressure equals 8 mm Hg,  consistent with moderate pulmonary pressures.  6. Agitated saline injection and color flow Doppler  demonstrates no evidence of a patent foramen ovale.

## 2022-01-02 NOTE — PROGRESS NOTE ADULT - ASSESSMENT
81 M     h/o  HTN HLD PVD.   DVT/PE  in 2004  and  in 2005,  not on AC since   TIA, CVA.  BPH, dementia     p/w confusion.  from  alzheimers  dementia     * LV thrombi on echo     was  on iv heparin,  now  on  eliquis    * cardiomyopathy,  echo, 15% with moderate MR, and severe LV and RV dysfunction.   -per  card, Given patients AMS/ dementia and advanced age, pt is currently not a candidate for ischemic evaluation with catheterization./ on me d rx  *  Acute decompensated systolic heart failure  on iv lasix  -*  elevated , Troponin  elevated 2/2 demand ischemia/ chf.  and not form mi  *  ?  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 on toprol   unable to titrate given low  sbp/ ep  called  *  afib  on tele,  on a/c  already  pt  with  ectropion  of  b/l lower  lids.     awaiting  hospice eval/ pt  is non verbal,  advanced   dementia ,  functional  quadriplegia    awiating care  cirtn  f/p  for  hospice   no  other intervention  planned  for  pt.  proceed  with   d/c  planning     Delaney, daughter .  wants   pt   dnr/dni , and  [facility] filled  requesting hospice eval  for services  pmd  dr garcia. alberto. in [facility]< from: TTE with Doppler (w/Cont) (12.30.21 @ 10:44) >  Conclusions:  1. Tethered mitral valve leaflets with normal opening.  Moderate mitral regurgitation.  2. Severe global left ventricular systolic dysfunction.  Endocardial visualization enhanced with intravenous  injection of Ultrasonic Enhancing Agent (Definity). At  least 2 Left ventricular thrombi seen in apex. The largest  measures 1.7 cm x 1.6cm.  3. Right ventricular enlargement with decreased right  ventricular systolic function.  4. Normal tricuspid valve. Moderate tricuspid  regurgitation.  5. Estimated pulmonary artery systolic pressure equals 57  mm Hg, assuming right atrial pressure equals 8 mm Hg,  consistent with moderate pulmonary pressures.  6. Agitated saline injection and color flow Doppler  demonstrates no evidence of a patent foramen ovale.  *** No previous Echo exam.  Findings Discussed With Dr. Nelda Liriano  ------------------------------------------------------------------------  < end of copied text >       rad< from: [facility] Duplex Lower Ext Vein Scan, Bilat (12.30.21 @ 05:45) >  Deep venous thrombosis in the left poplitealvein and the tibioperoneal   trunk with partial occlusion. There is flow within the vein adjacent to   the clot. The clot does not appear to be echogenic.  Doppler examination shows flow.  Limited visualization of the left calf veins. The peroneal and soleal   veins appear patent.  IMPRESSION:  Nonocclusive thrombus below the knee in the left leg of indeterminate age.  Dr Neri was given results at the time of the study with read back   confirmation. Findings also discussed with Dr Duarte by Dr Chavez at 523   AM on 12/30/21.  --- End of Report --  < end of copied text >

## 2022-01-02 NOTE — PROGRESS NOTE ADULT - SUBJECTIVE AND OBJECTIVE BOX
Cardiovascular Disease Progress Note    Overnight events: No acute events overnight.  Mr. Black is more alert. He is resting in bed comfortably and saturating well on room air.   Otherwise review of systems negative    Objective Findings:  T(C): 36.3 (01-02-22 @ 11:17), Max: 36.3 (01-01-22 @ 22:17)  HR: 89 (01-02-22 @ 11:17) (74 - 89)  BP: 112/87 (01-02-22 @ 11:17) (110/68 - 122/78)  RR: 18 (01-02-22 @ 12:50) (18 - 18)  SpO2: 95% (01-02-22 @ 12:50) (93% - 97%)  Wt(kg): --  Daily     Daily       Physical Exam:  Gen: NAD; Patient resting comfortably  HEENT: EOMI, Blepharitis bilaterally  CV: Irregular rhythm, normal S1 + S2, no m/r/g  Lungs:  Normal respiratory effort; clear to auscultation bilaterally  Abd: soft, non-tender; bowel sounds present  Ext: No edema; warm and well perfused    Telemetry: Afib    Laboratory Data:                        14.5   7.25  )-----------( 224      ( 02 Jan 2022 09:49 )             44.7     01-01    142  |  104  |  30<H>  ----------------------------<  138<H>  3.8   |  22  |  1.42<H>    Ca    9.4      01 Jan 2022 06:14      PTT - ( 01 Jan 2022 06:14 )  PTT:85.7 sec          Inpatient Medications:  MEDICATIONS  (STANDING):  aMIOdarone    Tablet 400 milliGRAM(s) Oral two times a day  apixaban 10 milliGRAM(s) Oral every 12 hours  artificial  tears Solution 1 Drop(s) Both EYES four times a day  erythromycin   Ointment 1 Application(s) Both EYES at bedtime  furosemide   Injectable 40 milliGRAM(s) IV Push daily  melatonin 3 milliGRAM(s) Oral at bedtime  metoprolol succinate ER 25 milliGRAM(s) Oral daily  QUEtiapine 25 milliGRAM(s) Oral at bedtime      Assessment: 81 M with pmhx of HTN HLD PVD. 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  BPH pw confusion.    Plan of Care:    #LV thrombus  - Pt found to have multiple LV thrombi on TTE  - Started on Eliquis (New studies shows same efficacy as Warfarin)  - TTE shows severely reduced LVEF at 15% with moderate MR, and severe LV and RV dysfunction.   - Given patients AMS/ dementia and advanced age, pt is currently not a candidate for ischemic evaluation with catheterization. Will continue to medically manage Mr. Black.   - Pt DNR    #Acute decompensated systolic heart failure  - BNP elevated  - IV diuresis. Will transition to PO tomorrow.   - Monitor I and O, replenish lytes as needed  - Troponins elevated 2/2 demand ischemia  - Medical management at this time.     #Acute renal failure  - Unknown baseline.   - Management as per nephro    #LLE DVT  - Eliquis  - Would consider heme/onc input for hypercoagulable workup given history of DVTs in the past     #HTN  BP acceptable  - Continue BB    #HLD  Statin therapy    #PVCs  Multifocal PVCs  BB started  Started on amiodarone   Maintain K around 4 and Mg >2.   Not on AICD candidate.   EP input reviewed and appreciated.       #ACP (advance care planning)-  Advanced care planning was addressed. Will continue current conservative cardiac management at this time. Risks, benefits and alternatives of medical treatment and procedures were addressed. 30 minutes spent addressing advance care plans.          Over 25 minutes spent on total encounter; more than 50% of the visit was spent counseling and/or coordinating care by the attending physician.      Jacky Mccarthy D.O.   Cardiovascular Disease  [phone]

## 2022-01-02 NOTE — PROGRESS NOTE ADULT - ASSESSMENT
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  BPH pw confusion. Obtained history from patients daughter Chika - Patient noted to have shortness of breath   noted to have dyspnea on exertion associated with cough tested for Covid negative pmd s office. Unable to obtain review of systems due to patient condition. Patient poor historian.   Patient  s/p cataract surgery wears glasses since then.  pt with severe LV dysfunction etiology, ischemia needs to be ruled out not a candidate for aggressive measures  continue beta blocker  will add amio if recurrent WCT  keep k>4  continue iv heparin  not a candidate for AICD  tsh  magnesium level  fu lytes closely  still with ectopy but not significant runs  hiv/spep/upep/ferritin ?cause of cardiomyopathy

## 2022-01-02 NOTE — PROVIDER CONTACT NOTE (OTHER) - RECOMMENDATIONS
Digna Braun NP aware; NP to reorder enhanced supervision- RN waiting on staffing to assess if pt can be placed on covered enhanced supervision

## 2022-01-03 LAB
ANION GAP SERPL CALC-SCNC: 21 MMOL/L — HIGH (ref 5–17)
BUN SERPL-MCNC: 46 MG/DL — HIGH (ref 7–23)
CALCIUM SERPL-MCNC: 9.3 MG/DL — SIGNIFICANT CHANGE UP (ref 8.4–10.5)
CHLORIDE SERPL-SCNC: 104 MMOL/L — SIGNIFICANT CHANGE UP (ref 96–108)
CO2 SERPL-SCNC: 18 MMOL/L — LOW (ref 22–31)
CREAT SERPL-MCNC: 1.76 MG/DL — HIGH (ref 0.5–1.3)
FERRITIN SERPL-MCNC: 95 NG/ML — SIGNIFICANT CHANGE UP (ref 30–400)
GLUCOSE SERPL-MCNC: 109 MG/DL — HIGH (ref 70–99)
HIV 1+2 AB+HIV1 P24 AG SERPL QL IA: SIGNIFICANT CHANGE UP
POTASSIUM SERPL-MCNC: 4.2 MMOL/L — SIGNIFICANT CHANGE UP (ref 3.5–5.3)
POTASSIUM SERPL-SCNC: 4.2 MMOL/L — SIGNIFICANT CHANGE UP (ref 3.5–5.3)
SODIUM SERPL-SCNC: 143 MMOL/L — SIGNIFICANT CHANGE UP (ref 135–145)

## 2022-01-03 RX ORDER — SODIUM CHLORIDE 9 MG/ML
1000 INJECTION INTRAMUSCULAR; INTRAVENOUS; SUBCUTANEOUS
Refills: 0 | Status: DISCONTINUED | OUTPATIENT
Start: 2022-01-03 | End: 2022-01-06

## 2022-01-03 RX ADMIN — Medication 1 DROP(S): at 17:00

## 2022-01-03 RX ADMIN — Medication 1 DROP(S): at 22:00

## 2022-01-03 RX ADMIN — Medication 40 MILLIGRAM(S): at 05:15

## 2022-01-03 RX ADMIN — Medication 1 DROP(S): at 11:42

## 2022-01-03 RX ADMIN — APIXABAN 10 MILLIGRAM(S): 2.5 TABLET, FILM COATED ORAL at 17:00

## 2022-01-03 RX ADMIN — Medication 1 APPLICATION(S): at 22:55

## 2022-01-03 RX ADMIN — QUETIAPINE FUMARATE 25 MILLIGRAM(S): 200 TABLET, FILM COATED ORAL at 22:54

## 2022-01-03 RX ADMIN — AMIODARONE HYDROCHLORIDE 400 MILLIGRAM(S): 400 TABLET ORAL at 17:00

## 2022-01-03 RX ADMIN — SODIUM CHLORIDE 50 MILLILITER(S): 9 INJECTION INTRAMUSCULAR; INTRAVENOUS; SUBCUTANEOUS at 10:28

## 2022-01-03 RX ADMIN — Medication 3 MILLIGRAM(S): at 22:54

## 2022-01-03 RX ADMIN — Medication 1 DROP(S): at 05:07

## 2022-01-03 NOTE — PROGRESS NOTE ADULT - SUBJECTIVE AND OBJECTIVE BOX
afberile, more  alert    REVIEW OF SYSTEMS:  GEN: no fever,    no chills  RESP: no SOB,   no cough  CVS: no chest pain,   no palpitations  GI: no abdominal pain,   no nausea,   no vomiting,   no constipation,   no diarrhea  : no dysuria,   no frequency  NEURO: no headache,   no dizziness  PSYCH: no depression,   not anxious  Derm : no rash    MEDICATIONS  (STANDING):  aMIOdarone    Tablet 400 milliGRAM(s) Oral two times a day  apixaban 10 milliGRAM(s) Oral every 12 hours  artificial  tears Solution 1 Drop(s) Both EYES four times a day  erythromycin   Ointment 1 Application(s) Both EYES at bedtime  melatonin 3 milliGRAM(s) Oral at bedtime  metoprolol succinate ER 25 milliGRAM(s) Oral daily  QUEtiapine 25 milliGRAM(s) Oral at bedtime    MEDICATIONS  (PRN):      Vital Signs Last 24 Hrs  T(C): 36.5 (03 Jan 2022 04:19), Max: 36.5 (02 Jan 2022 20:21)  T(F): 97.7 (03 Jan 2022 04:19), Max: 97.7 (02 Jan 2022 20:21)  HR: 97 (03 Jan 2022 04:19) (84 - 97)  BP: 128/83 (03 Jan 2022 04:19) (112/87 - 128/83)  BP(mean): --  RR: 18 (03 Jan 2022 08:56) (18 - 18)  SpO2: 95% (03 Jan 2022 08:56) (95% - 97%)  CAPILLARY BLOOD GLUCOSE      POCT Blood Glucose.: 149 mg/dL (02 Jan 2022 17:04)    I&O's Summary    02 Jan 2022 07:01  -  03 Jan 2022 07:00  --------------------------------------------------------  IN: 600 mL / OUT: 300 mL / NET: 300 mL        PHYSICAL EXAM:  HEAD:  Atraumatic, Normocephalic  NECK: Supple, No   JVD  CHEST/LUNG:   no     rales,     no,    rhonchi  HEART: Regular rate and rhythm;         murmur  ABDOMEN: Soft, Nontender, ;   EXTREMITIES:   no     edema  NEUROLOGY:  alert    LABS:                        14.5   7.25  )-----------( 224      ( 02 Jan 2022 09:49 )             44.7     01-03    143  |  104  |  46<H>  ----------------------------<  109<H>  4.2   |  18<L>  |  1.76<H>    Ca    9.3      03 Jan 2022 06:56                      Thyroid Stimulating Hormone, Serum: 3.03 uIU/mL (12-31 @ 08:46)          Consultant(s) Notes Reviewed:      Care Discussed with Consultants/Other Providers:

## 2022-01-03 NOTE — PROGRESS NOTE ADULT - SUBJECTIVE AND OBJECTIVE BOX
EPS    PROGRESS  NOTE   ________________________________________________    CHIEF COMPLAINT:Patient is a 81y old  Male who presents with a chief complaint of AMS, SOB  x 1 week (02 Jan 2022 13:42)  doing better.  	  REVIEW OF SYSTEMS:  CONSTITUTIONAL: No fever, weight loss, or fatigue  EYES: No eye pain, visual disturbances, or discharge  ENT:  No difficulty hearing, tinnitus, vertigo; No sinus or throat pain  NECK: No pain or stiffness  RESPIRATORY: No cough, wheezing, chills or hemoptysis; decrease  Shortness of Breath  CARDIOVASCULAR: No chest pain, palpitations, passing out, dizziness, or leg swelling  GASTROINTESTINAL: No abdominal or epigastric pain. No nausea, vomiting, or hematemesis; No diarrhea or constipation. No melena or hematochezia.  GENITOURINARY: No dysuria, frequency, hematuria, or incontinence  NEUROLOGICAL: No headaches, memory loss, loss of strength, numbness, or tremors  SKIN: No itching, burning, rashes, or lesions   LYMPH Nodes: No enlarged glands  ENDOCRINE: No heat or cold intolerance; No hair loss  MUSCULOSKELETAL: No joint pain or swelling; No muscle, back, or extremity pain  PSYCHIATRIC: No depression, anxiety, mood swings, or difficulty sleeping  HEME/LYMPH: No easy bruising, or bleeding gums  ALLERGY AND IMMUNOLOGIC: No hives or eczema	    [ ] All others negative	  [ ] Unable to obtain    PHYSICAL EXAM:  T(C): 36.5 (01-03-22 @ 04:19), Max: 36.5 (01-02-22 @ 20:21)  HR: 97 (01-03-22 @ 04:19) (84 - 97)  BP: 128/83 (01-03-22 @ 04:19) (112/87 - 128/83)  RR: 18 (01-03-22 @ 08:56) (18 - 18)  SpO2: 95% (01-03-22 @ 08:56) (95% - 97%)  Wt(kg): --  I&O's Summary    02 Jan 2022 07:01  -  03 Jan 2022 07:00  --------------------------------------------------------  IN: 600 mL / OUT: 300 mL / NET: 300 mL        Appearance: Normal	  HEENT:   Normal oral mucosa, PERRL, EOMI	  Lymphatic: No lymphadenopathy  Cardiovascular: Normal S1 S2, No JVD, +murmurs, No edema  Respiratory: Lungs clear to auscultation	  Psychiatry: A & O x 3, Mood & affect appropriate  Gastrointestinal:  Soft, Non-tender, + BS	  Skin: No rashes, No ecchymoses, No cyanosis	  Neurologic: Non-focal  Extremities: Normal range of motion, No clubbing, cyanosis or edema  Vascular: Peripheral pulses palpable 2+ bilaterally    MEDICATIONS  (STANDING):  aMIOdarone    Tablet 400 milliGRAM(s) Oral two times a day  apixaban 10 milliGRAM(s) Oral every 12 hours  artificial  tears Solution 1 Drop(s) Both EYES four times a day  erythromycin   Ointment 1 Application(s) Both EYES at bedtime  melatonin 3 milliGRAM(s) Oral at bedtime  metoprolol succinate ER 25 milliGRAM(s) Oral daily  QUEtiapine 25 milliGRAM(s) Oral at bedtime      TELEMETRY: 	    ECG:  	  RADIOLOGY:  OTHER: 	  	  LABS:	 	    CARDIAC MARKERS:                                14.5   7.25  )-----------( 224      ( 02 Jan 2022 09:49 )             44.7     01-03    143  |  104  |  46<H>  ----------------------------<  109<H>  4.2   |  18<L>  |  1.76<H>    Ca    9.3      03 Jan 2022 06:56      proBNP: Serum Pro-Brain Natriuretic Peptide: 79051 pg/mL (12-30 @ 01:37)    Lipid Profile:   HgA1c:   TSH: Thyroid Stimulating Hormone, Serum: 3.03 uIU/mL (12-31 @ 08:46)          Assessment and plan  ---------------------------  80 yo hx htn, hld, 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  BPH pw confusion. Obtained history from patients daughter Chika - Patient noted to have shortness of breath   noted to have dyspnea on exertion associated with cough tested for Covid negative pmd s office. Unable to obtain review of systems due to patient condition. Patient poor historian.   Patient  s/p cataract surgery wears glasses since then.  pt with severe LV dysfunction etiology, ischemia needs to be ruled out not a candidate for aggressive measures  continue beta blocker  keep k>4  continue iv heparin, switched to eliquis  not a candidate for AICD  tsh  magnesium level  fu lytes closely  hiv/spep/upep/ferritin ?cause of cardiomyopathy  tele much improved  hydralazine and nitrate if bp tolerates

## 2022-01-03 NOTE — PROGRESS NOTE ADULT - ASSESSMENT
81 M     h/o  HTN HLD PVD.   DVT/PE  in 2004  and  in 2005,  not on AC since   TIA, CVA.  BPH, dementia     p/w confusion.  from  alzheimers  dementia     * LV thrombi on echo     was  on iv heparin,  now  on  eliquis    * cardiomyopathy,  echo, 15% with moderate MR, and severe LV and RV dysfunction.   -per  card, Given patients AMS/ dementia and advanced age, pt is currently not a candidate for ischemic evaluation with catheterization./ on me d rx  *  Acute decompensated systolic heart failure  on iv lasix  -*  elevated , Troponin  elevated 2/2 demand ischemia/ chf.  and not form mi  *  ?  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 on toprol   unable to titrate given low  sbp/ ep  called  *  afib  on tele,  on a/c  already  pt  with  ectropion  of  b/l lower  lids.     awaiting  hospice eval/ pt  is non verbal,  advanced   dementia ,  functional  quadriplegia    awiating care  cirtn  f/p  for  hospice   Sammi, check bladder scan, on iv  fluids  bmp  in am     Delaney, daughter .  wants   pt   dnr/dni , and  [facility] filled  requesting hospice eval  for services  pmd  dr garcia. alberto. in [facility]< from: TTE with Doppler (w/Cont) (12.30.21 @ 10:44) >  Conclusions:  1. Tethered mitral valve leaflets with normal opening.  Moderate mitral regurgitation.  2. Severe global left ventricular systolic dysfunction.  Endocardial visualization enhanced with intravenous  injection of Ultrasonic Enhancing Agent (Definity). At  least 2 Left ventricular thrombi seen in apex. The largest  measures 1.7 cm x 1.6cm.  3. Right ventricular enlargement with decreased right  ventricular systolic function.  4. Normal tricuspid valve. Moderate tricuspid  regurgitation.  5. Estimated pulmonary artery systolic pressure equals 57  mm Hg, assuming right atrial pressure equals 8 mm Hg,  consistent with moderate pulmonary pressures.  6. Agitated saline injection and color flow Doppler  demonstrates no evidence of a patent foramen ovale.  *** No previous Echo exam.  Findings Discussed With Dr. Nelda Liriano  ------------------------------------------------------------------------  < end of copied text >       rad< from: [facility] Duplex Lower Ext Vein Scan, Bilat (12.30.21 @ 05:45) >  Deep venous thrombosis in the left poplitealvein and the tibioperoneal   trunk with partial occlusion. There is flow within the vein adjacent to   the clot. The clot does not appear to be echogenic.  Doppler examination shows flow.  Limited visualization of the left calf veins. The peroneal and soleal   veins appear patent.  IMPRESSION:  Nonocclusive thrombus below the knee in the left leg of indeterminate age.  Dr Neri was given results at the time of the study with read back   confirmation. Findings also discussed with Dr Duarte by Dr Chavez at 523   AM on 12/30/21.  --- End of Report --  < end of copied text >

## 2022-01-03 NOTE — PROGRESS NOTE ADULT - SUBJECTIVE AND OBJECTIVE BOX
Cardiovascular Disease Progress Note    Overnight events: No acute events overnight.  Mr. Black is resting in bed comfortably. He is alert but not oriented 2/2 dementia. ROS unable to be obtained.   Otherwise review of systems negative    Objective Findings:  T(C): 36.3 (22 @ 11:05), Max: 36.5 (22 @ 20:21)  HR: 86 (22 @ 11:05) (84 - 97)  BP: 129/83 (22 @ 11:05) (119/79 - 129/83)  RR: 18 (22 @ 11:05) (18 - 18)  SpO2: 96% (22 @ 11:05) (95% - 97%)  Wt(kg): --  Daily     Daily Weight in k.5 (2022 08:38)      Physical Exam:  Gen: NAD; Patient resting comfortably  HEENT: EOMI, Normocephalic/ atraumatic  CV: RRR, normal S1 + S2, no m/r/g  Lungs:  Normal respiratory effort; clear to auscultation bilaterally  Abd: soft, non-tender; bowel sounds present  Ext: No edema; warm and well perfused    Telemetry: Afib    Laboratory Data:                        14.5   7.25  )-----------( 224      ( 2022 09:49 )             44.7         143  |  104  |  46<H>  ----------------------------<  109<H>  4.2   |  18<L>  |  1.76<H>    Ca    9.3      2022 06:56                Inpatient Medications:  MEDICATIONS  (STANDING):  aMIOdarone    Tablet 400 milliGRAM(s) Oral two times a day  apixaban 10 milliGRAM(s) Oral every 12 hours  artificial  tears Solution 1 Drop(s) Both EYES four times a day  erythromycin   Ointment 1 Application(s) Both EYES at bedtime  melatonin 3 milliGRAM(s) Oral at bedtime  metoprolol succinate ER 25 milliGRAM(s) Oral daily  QUEtiapine 25 milliGRAM(s) Oral at bedtime  sodium chloride 0.9%. 1000 milliLiter(s) (50 mL/Hr) IV Continuous <Continuous>      Assessment: 81 M with pmhx of HTN HLD PVD. 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  BPH pw confusion.    Plan of Care:    #LV thrombus  - Pt found to have multiple LV thrombi on TTE  - Started on Eliquis (New studies shows same efficacy as Warfarin)  - TTE shows severely reduced LVEF at 15% with moderate MR, and severe LV and RV dysfunction.   - Given patients AMS/ dementia and advanced age, pt is currently not a candidate for ischemic evaluation with catheterization. Will continue to medically manage Mr. Black.   - Pt DNR    #Acute decompensated systolic heart failure  - BNP elevated  - Hold diuretics in setting of JEWEL and rising creatinine.   - Monitor I and O, replenish lytes as needed  - Troponins elevated 2/2 demand ischemia  - Medical management at this time.     #Afib  - Rate controlled.   - Continue eliquis  - Continue BB    #Acute renal failure  - Unknown baseline.   - Management as per nephro    #LLE DVT  - Eliquis  - Would consider heme/onc input for hypercoagulable workup given history of DVTs in the past     #HTN  BP acceptable  - Continue BB    #HLD  Statin therapy    #PVCs  Multifocal PVCs  BB started  Started on amiodarone   Maintain K around 4 and Mg >2.   Not on AICD candidate.   EP input reviewed and appreciated.             Over 25 minutes spent on total encounter; more than 50% of the visit was spent counseling and/or coordinating care by the attending physician.      Jacky Mccarthy D.O.   Cardiovascular Disease  [phone]

## 2022-01-04 LAB
ANION GAP SERPL CALC-SCNC: 19 MMOL/L — HIGH (ref 5–17)
BUN SERPL-MCNC: 48 MG/DL — HIGH (ref 7–23)
CALCIUM SERPL-MCNC: 9.2 MG/DL — SIGNIFICANT CHANGE UP (ref 8.4–10.5)
CHLORIDE SERPL-SCNC: 101 MMOL/L — SIGNIFICANT CHANGE UP (ref 96–108)
CO2 SERPL-SCNC: 23 MMOL/L — SIGNIFICANT CHANGE UP (ref 22–31)
CREAT SERPL-MCNC: 1.56 MG/DL — HIGH (ref 0.5–1.3)
GLUCOSE SERPL-MCNC: 116 MG/DL — HIGH (ref 70–99)
MAGNESIUM SERPL-MCNC: 1.9 MG/DL — SIGNIFICANT CHANGE UP (ref 1.6–2.6)
POTASSIUM SERPL-MCNC: 3.6 MMOL/L — SIGNIFICANT CHANGE UP (ref 3.5–5.3)
POTASSIUM SERPL-SCNC: 3.6 MMOL/L — SIGNIFICANT CHANGE UP (ref 3.5–5.3)
SODIUM SERPL-SCNC: 143 MMOL/L — SIGNIFICANT CHANGE UP (ref 135–145)

## 2022-01-04 RX ORDER — METOPROLOL TARTRATE 50 MG
25 TABLET ORAL
Refills: 0 | Status: DISCONTINUED | OUTPATIENT
Start: 2022-01-04 | End: 2022-01-06

## 2022-01-04 RX ADMIN — AMIODARONE HYDROCHLORIDE 400 MILLIGRAM(S): 400 TABLET ORAL at 18:10

## 2022-01-04 RX ADMIN — Medication 25 MILLIGRAM(S): at 18:11

## 2022-01-04 RX ADMIN — Medication 25 MILLIGRAM(S): at 05:17

## 2022-01-04 RX ADMIN — Medication 3 MILLIGRAM(S): at 21:39

## 2022-01-04 RX ADMIN — Medication 1 DROP(S): at 18:10

## 2022-01-04 RX ADMIN — APIXABAN 10 MILLIGRAM(S): 2.5 TABLET, FILM COATED ORAL at 18:11

## 2022-01-04 RX ADMIN — Medication 1 DROP(S): at 12:02

## 2022-01-04 RX ADMIN — APIXABAN 10 MILLIGRAM(S): 2.5 TABLET, FILM COATED ORAL at 05:17

## 2022-01-04 RX ADMIN — AMIODARONE HYDROCHLORIDE 400 MILLIGRAM(S): 400 TABLET ORAL at 05:17

## 2022-01-04 RX ADMIN — Medication 1 DROP(S): at 05:18

## 2022-01-04 RX ADMIN — QUETIAPINE FUMARATE 25 MILLIGRAM(S): 200 TABLET, FILM COATED ORAL at 21:39

## 2022-01-04 RX ADMIN — Medication 1 APPLICATION(S): at 21:40

## 2022-01-04 NOTE — PROGRESS NOTE ADULT - SUBJECTIVE AND OBJECTIVE BOX
ep   PROGRESS  NOTE   ________________________________________________    CHIEF COMPLAINT:Patient is a 81y old  Male who presents with a chief complaint of AMS, SOB  x 1 week (04 Jan 2022 07:59)  no complain.  	  REVIEW OF SYSTEMS:  CONSTITUTIONAL: No fever, weight loss, or fatigue  EYES: No eye pain, visual disturbances, or discharge  ENT:  No difficulty hearing, tinnitus, vertigo; No sinus or throat pain  NECK: No pain or stiffness  RESPIRATORY: No cough, wheezing, chills or hemoptysis; No Shortness of Breath  CARDIOVASCULAR: No chest pain, palpitations, passing out, dizziness, or leg swelling  GASTROINTESTINAL: No abdominal or epigastric pain. No nausea, vomiting, or hematemesis; No diarrhea or constipation. No melena or hematochezia.  GENITOURINARY: No dysuria, frequency, hematuria, or incontinence  NEUROLOGICAL: No headaches, memory loss, loss of strength, numbness, or tremors  SKIN: No itching, burning, rashes, or lesions   LYMPH Nodes: No enlarged glands  ENDOCRINE: No heat or cold intolerance; No hair loss  MUSCULOSKELETAL: No joint pain or swelling; No muscle, back, or extremity pain  PSYCHIATRIC: No depression, anxiety, mood swings, or difficulty sleeping  HEME/LYMPH: No easy bruising, or bleeding gums  ALLERGY AND IMMUNOLOGIC: No hives or eczema	    [ ] All others negative	  [x ] Unable to obtain    PHYSICAL EXAM:  T(C): 36.3 (01-04-22 @ 04:44), Max: 36.6 (01-03-22 @ 21:15)  HR: 80 (01-04-22 @ 08:05) (65 - 86)  BP: 126/81 (01-04-22 @ 08:05) (117/81 - 129/83)  RR: 17 (01-04-22 @ 08:05) (17 - 18)  SpO2: 94% (01-04-22 @ 08:05) (94% - 97%)  Wt(kg): --  I&O's Summary    03 Jan 2022 07:01  -  04 Jan 2022 07:00  --------------------------------------------------------  IN: 660 mL / OUT: 200 mL / NET: 460 mL        Appearance: Normal	  HEENT:   Normal oral mucosa, PERRL, EOMI	  Lymphatic: No lymphadenopathy  Cardiovascular: Normal S1 S2, No JVD, + murmurs, No edema  Respiratory: Lungs clear to auscultation	  Psychiatry: A & O x 3, Mood & affect appropriate  Gastrointestinal:  Soft, Non-tender, + BS	  Skin: No rashes, No ecchymoses, No cyanosis	  Neurologic: Non-focal  Extremities: Normal range of motion, No clubbing, cyanosis or edema  Vascular: Peripheral pulses palpable 2+ bilaterally    MEDICATIONS  (STANDING):  aMIOdarone    Tablet 400 milliGRAM(s) Oral two times a day  apixaban 10 milliGRAM(s) Oral every 12 hours  artificial  tears Solution 1 Drop(s) Both EYES four times a day  erythromycin   Ointment 1 Application(s) Both EYES at bedtime  melatonin 3 milliGRAM(s) Oral at bedtime  metoprolol succinate ER 25 milliGRAM(s) Oral daily  QUEtiapine 25 milliGRAM(s) Oral at bedtime  sodium chloride 0.9%. 1000 milliLiter(s) (50 mL/Hr) IV Continuous <Continuous>      TELEMETRY: 	    ECG:  	  RADIOLOGY:  OTHER: 	  	  LABS:	 	    CARDIAC MARKERS:                                14.5   7.25  )-----------( 224      ( 02 Jan 2022 09:49 )             44.7     01-03    143  |  104  |  46<H>  ----------------------------<  109<H>  4.2   |  18<L>  |  1.76<H>    Ca    9.3      03 Jan 2022 06:56      proBNP: Serum Pro-Brain Natriuretic Peptide: 60124 pg/mL (12-30 @ 01:37)    Lipid Profile:   HgA1c:   TSH: Thyroid Stimulating Hormone, Serum: 3.03 uIU/mL (12-31 @ 08:46)          Assessment and plan  ---------------------------  80 yo hx htn, hld, 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  BPH pw confusion. Obtained history from patients daughter Chika - Patient noted to have shortness of breath   noted to have dyspnea on exertion associated with cough tested for Covid negative pmd s office. Unable to obtain review of systems due to patient condition. Patient poor historian.   Patient  s/p cataract surgery wears glasses since then.  pt with severe LV dysfunction etiology, ischemia needs to be ruled out not a candidate for aggressive measures  continue beta blocker  keep k>4  continue iv heparin, switched to eliquis  not a candidate for AICD  tsh  magnesium level  fu lytes closely  hiv/spep/upep/ferritin ?cause of cardiomyopathy  tele much improved  hydralazine and nitrate if bp tolerates  pt is DNR  increase beta blocker as tolerated

## 2022-01-04 NOTE — PROVIDER CONTACT NOTE (OTHER) - ACTION/TREATMENT ORDERED:
NANCY Gibbons aware, cardiology f/u, will continue to monitor.
NANCY Gibbons aware, cardiology f/u
PA made aware. EKG ordered
NANCY Salazar made aware. EKG and labs ordered
NANCY Salazar to come to bedside and assess pt.
Digna Braun NP aware; NP to reorder enhanced supervision- RN waiting on staffing to assess if pt can be placed on covered enhanced supervision
Provider made aware of patient tele event

## 2022-01-04 NOTE — PROVIDER CONTACT NOTE (OTHER) - ASSESSMENT
Pt AOx1, asymptomatic to event, patient resting comfortably in bed (enhanced supervision at bedside during event)
Pt a&ox1, asymptomatic
Pt denies any CP or SOB, VSS. pt resting in bed comfortably.
pt a/ox1,when attempting to place pt back on tele - pt becoming aggressive
pt denies any CP SOB, VSS. pt resting in bed comfortably
pt a&ox1 asymptomatic. VSS
pt a&ox1, VSS. No c/o cp. Pt asymptomatic

## 2022-01-04 NOTE — PROVIDER CONTACT NOTE (OTHER) - DATE AND TIME:
01-Jan-2022 06:20
31-Dec-2021 14:54
31-Dec-2021 21:30
01-Jan-2022 14:46
02-Jan-2022 19:40
04-Jan-2022 08:05
30-Dec-2021 20:15

## 2022-01-04 NOTE — DIETITIAN INITIAL EVALUATION ADULT. - ORAL INTAKE PTA/DIET HISTORY
PTA information due to patient altered mental status, AAOx0 at baseline, noted poor historian. PTA information due to patient altered mental status, AAOx0 at baseline, noted poor historian. No known therapeutic diets followed. No known micronutrients/supplements taken PTA. No known food allergies. PTA information due to patient altered mental status, AAOx0 at baseline, noted poor historian. No known therapeutic diets followed. No known micronutrients/supplements taken PTA. No known food allergies. Unknown how pt was eating PTA.

## 2022-01-04 NOTE — DIETITIAN INITIAL EVALUATION ADULT. - OTHER CALCULATIONS
Needs based on current weight 184 pounds (83.4 kg) with considerations for increased needs due to JEWEL. Fluid needs deferred to provider. Needs based on current weight 184 pounds (83.4 kg). Fluid needs deferred to provider. Daily weight used for BMI.

## 2022-01-04 NOTE — CHART NOTE - NSCHARTNOTEFT_GEN_A_CORE
Advanced Care Planning:  I have had goals of care discussion, advanced directive and code status with patient/family    and  in  coordinating   patient care.     all questions answered and expressed understanding of the plan.     pt  is  dnr/  dni,  has  Car
Notified by RN that pt has erythema to bilateral eyes.   Saw and examined patient at bedside. Pt a poor historian and unable to answer questions appropriately. Denies any pain or visions changes.   Bilateral eyes with significant erythema to the lower lid with some purulent discharge noted.   Spoke with opthalmology overnight.  No immediate intervention, opthalmology will see tomorrow    Marie Hannah PA-C  Department of Medicine
Reason for Notification:   Notified by RN and Telemetry that the above patient had a run of wide complex tachycardia on monitor for 20 beats. This the patient's first episode of wide complex on this admission.     Events/History of Present Illness  Patient at documented baseline status without acute change in condition or complaints. Vitals stable. Patient was not aware of the wide complex.  Pt is confused at baseline and unable to provide many details.     T(C): 36.2 (12-30-21 @ 12:20), Max: 36.8 (12-30-21 @ 04:15)  HR: 57 (12-30-21 @ 12:20) (57 - 83)  BP: 111/75 (12-30-21 @ 12:20) (111/75 - 138/67)  RR: 16 (12-30-21 @ 12:20) (16 - 28)  SpO2: 95% (12-30-21 @ 12:20) (92% - 98%)                        13.7   9.94  )-----------( 222      ( 30 Dec 2021 13:18 )             42.4        Assessment/Plan:  HPI:  82 yo hx htn, hld, 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  BPH pw confusion.   Obtained history from patients daughter Chika - Patient noted to have shortness of breath   noted to have dyspnea on exertion associated with cough tested for Covid negative pmd s office.     Unable to obtain review of systems due to patient condition. Patient poor historian.   Patient  s/p cataract surgery wears glasses since then.  (30 Dec 2021 12:07)    Discussed with cardiologist Dr. Mccarthy. Will replete electrolytes as needed. Pt did not receive today's today of metoprolol, so 1 dose ordered.     Plan:  - Stat BMP/Mag. Will replace as needed.   - Continue cardiac monitoring  - EKG obtained and reviewed   - 1x 25mg dose of metoprolol   - f/u with day team  - discussed with cardiology Dr. Fabio Hannah PA-C  Department of Medicine
[facility] CHF education chart note    Patient was visited by [facility] for CHF education. Heart failure education not appropriate due to patient with Alzheimer's/dementia, AAOx0 at baseline.
Notified by RN that pt converted to AFib on tele this AM  EKG shows Afib with Hr of 118. Pt does not have a history of Afib  Pt asymptomatic at this time but a poor historian and confused at baseline  Pt is currently on a heparin gtt and metoprolol  Spoke with cardiologist Dr. Fabio Mccarthy will see patient this morning, no immediate intervention at this time  Will continue to monitor on tele  F/u with AM team    Marie Hannah PA-C  Department of Medicine

## 2022-01-04 NOTE — PROGRESS NOTE ADULT - SUBJECTIVE AND OBJECTIVE BOX
afebriel    REVIEW OF SYSTEMS:  GEN: no fever,    no chills  RESP: no SOB,   no cough  CVS: no chest pain,   no palpitations  GI: no abdominal pain,   no nausea,   no vomiting,   no constipation,   no diarrhea  : no dysuria,   no frequency  NEURO: no headache,   no dizziness  PSYCH: no depression,   not anxious  Derm : no rash    MEDICATIONS  (STANDING):  aMIOdarone    Tablet 400 milliGRAM(s) Oral two times a day  apixaban 10 milliGRAM(s) Oral every 12 hours  artificial  tears Solution 1 Drop(s) Both EYES four times a day  erythromycin   Ointment 1 Application(s) Both EYES at bedtime  melatonin 3 milliGRAM(s) Oral at bedtime  metoprolol succinate ER 25 milliGRAM(s) Oral two times a day  QUEtiapine 25 milliGRAM(s) Oral at bedtime  sodium chloride 0.9%. 1000 milliLiter(s) (50 mL/Hr) IV Continuous <Continuous>    MEDICATIONS  (PRN):      Vital Signs Last 24 Hrs  T(C): 36.3 (04 Jan 2022 04:44), Max: 36.6 (03 Jan 2022 21:15)  T(F): 97.4 (04 Jan 2022 04:44), Max: 97.8 (03 Jan 2022 21:15)  HR: 80 (04 Jan 2022 08:05) (65 - 86)  BP: 126/81 (04 Jan 2022 08:05) (117/81 - 129/83)  BP(mean): --  RR: 17 (04 Jan 2022 08:05) (17 - 18)  SpO2: 94% (04 Jan 2022 08:05) (94% - 97%)  CAPILLARY BLOOD GLUCOSE        I&O's Summary    03 Jan 2022 07:01  -  04 Jan 2022 07:00  --------------------------------------------------------  IN: 660 mL / OUT: 200 mL / NET: 460 mL        PHYSICAL EXAM:  HEAD:  Atraumatic, Normocephalic  NECK: Supple, No   JVD  CHEST/LUNG:   no     rales,     no,    rhonchi  HEART: Regular rate and rhythm;         murmur  ABDOMEN: Soft, Nontender, ;   EXTREMITIES:    no    edema  NEUROLOGY:  alert    LABS:                        14.5   7.25  )-----------( 224      ( 02 Jan 2022 09:49 )             44.7     01-03    143  |  104  |  46<H>  ----------------------------<  109<H>  4.2   |  18<L>  |  1.76<H>    Ca    9.3      03 Jan 2022 06:56                      Thyroid Stimulating Hormone, Serum: 3.03 uIU/mL (12-31 @ 08:46)          Consultant(s) Notes Reviewed:      Care Discussed with Consultants/Other Providers:

## 2022-01-04 NOTE — PROVIDER CONTACT NOTE (OTHER) - SITUATION
Tele event- 20 beats WCT
pt had  for 4.09 seconds on tele monitor
Pt tele event, 11 beats multifocal WCT
Tele Event- pt converted to afib
pt noncompliant with telemetry monitor- RN attempt various times to place telemetry monitor on patient including placing monitor on pts back- however pt repeatedly pulling telemetry monitor off
pt had 6 beats of WCT on tele monitor
Erythema in eyes

## 2022-01-04 NOTE — DIETITIAN INITIAL EVALUATION ADULT. - REASON INDICATOR FOR ASSESSMENT
STAR education, initial assessment warranted due to pt with CHF.  Source: medical record, RN. Pt interview inappropriate due to pt noted with Alzheimer's/dementia.

## 2022-01-04 NOTE — PROGRESS NOTE ADULT - ASSESSMENT
81 M     h/o  HTN HLD PVD.   DVT/PE  in 2004  and  in 2005,  not on AC since   TIA, CVA.  BPH, dementia     p/w confusion.  from  alzheimers  dementia     * LV thrombi on echo     was  on iv heparin,  now  on  eliquis    * cardiomyopathy,  echo, 15% with moderate MR, and severe LV and RV dysfunction.   -per  card, Given patients AMS/ dementia and advanced age, pt is currently not a candidate for ischemic evaluation with catheterization./ on me d rx  *  Acute decompensated systolic heart failure  on iv lasix  -*  elevated , Troponin  elevated 2/2 demand ischemia/ chf.  and not form mi  *  ?  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 on toprol   unable to titrate given low  sbp/ ep  called  *  afib  on tele,  on a/c  already  pt  with  ectropion  of  b/l lower  lids.     awaiting  hospice eval/ pt  is non verbal,  advanced   dementia ,  functional  quadriplegia    awiating care  cirtn  f/p  for  hospice   Sammi, check bladder scan, on iv  fluids  bmp   pending today     Delaney, daughter .  wants   pt   dnr/dni , and  Molst filled  requesting hospice eval  for services  pmd  dr garcia. alberto. in [facility]< from: TTE with Doppler (w/Cont) (12.30.21 @ 10:44) >  Conclusions:  1. Tethered mitral valve leaflets with normal opening.  Moderate mitral regurgitation.  2. Severe global left ventricular systolic dysfunction.  Endocardial visualization enhanced with intravenous  injection of Ultrasonic Enhancing Agent (Definity). At  least 2 Left ventricular thrombi seen in apex. The largest  measures 1.7 cm x 1.6cm.  3. Right ventricular enlargement with decreased right  ventricular systolic function.  4. Normal tricuspid valve. Moderate tricuspid  regurgitation.  5. Estimated pulmonary artery systolic pressure equals 57  mm Hg, assuming right atrial pressure equals 8 mm Hg,  consistent with moderate pulmonary pressures.  6. Agitated saline injection and color flow Doppler  demonstrates no evidence of a patent foramen ovale.  *** No previous Echo exam.  Findings Discussed With Dr. Nelda Liriano  ------------------------------------------------------------------------  < end of copied text >       rad< from: [facility] Duplex Lower Ext Vein Scan, Bilat (12.30.21 @ 05:45) >  Deep venous thrombosis in the left poplitealvein and the tibioperoneal   trunk with partial occlusion. There is flow within the vein adjacent to   the clot. The clot does not appear to be echogenic.  Doppler examination shows flow.  Limited visualization of the left calf veins. The peroneal and soleal   veins appear patent.  IMPRESSION:  Nonocclusive thrombus below the knee in the left leg of indeterminate age.  Dr Neri was given results at the time of the study with read back   confirmation. Findings also discussed with Dr Duarte by Dr Chavez at 523   AM on 12/30/21.  --- End of Report --  < end of copied text >

## 2022-01-04 NOTE — DIETITIAN INITIAL EVALUATION ADULT. - CHIEF COMPLAINT
Ejection Fraction...
Per Chart: Pt is a 82 y/o M with PMHx HTN, HLD, DVT/PE, TIA, CVA, BPH and confusion due to dementia/alzheimer's. Pt complaining of SOB x 1 week, admitted for acute CHF, previously on IV lasix. Pt currently with JEWEL, elevated creatinine preventing discharge, awaiting home hospice care.

## 2022-01-04 NOTE — DIETITIAN INITIAL EVALUATION ADULT. - ADD RECOMMEND
1. Continue to monitor nutrition related labs, intake, skin integrity, and hydration status. 2. RD to follow up per protocol as able.

## 2022-01-04 NOTE — PROVIDER CONTACT NOTE (OTHER) - REASON
Tele event- 20 beats WCT
6 beats of WCT
Tele Event- pt converted to afib
 for 4.09 seconds
Erythema in eyes
tele event
pt noncompliant with telemetry monitor

## 2022-01-04 NOTE — PROVIDER CONTACT NOTE (OTHER) - BACKGROUND
Pt admitting dx HF
Pt admitting dx heart failure
pt admitted for heart failure; pt on covered enhanced supervision overnight on 1/1 and pt having frequent WCT on tele
Pt admitting dx heart failure
Pt admitting dx hf

## 2022-01-04 NOTE — PROGRESS NOTE ADULT - SUBJECTIVE AND OBJECTIVE BOX
Cardiovascular Disease Progress Note    Overnight events: No acute events overnight.  Mr. Black is resting comfortably in bed. ROS unable to be obtained 2/2 dementia.   Otherwise review of systems negative    Objective Findings:  T(C): 36.3 (22 @ 04:44), Max: 36.6 (22 @ 21:15)  HR: 66 (22 @ 04:44) (65 - 86)  BP: 121/85 (22 @ 04:44) (117/81 - 129/83)  RR: 18 (22 @ 04:44) (18 - 18)  SpO2: 95% (22 @ 04:44) (95% - 97%)  Wt(kg): --  Daily     Daily Weight in k.5 (2022 08:38)      Physical Exam:  Gen: NAD; Patient resting comfortably  HEENT: EOMI, Normocephalic/ atraumatic  CV: Irregular rhythm, normal S1 + S2, no m/r/g  Lungs:  Normal respiratory effort; clear to auscultation bilaterally  Abd: soft, non-tender; bowel sounds present  Ext: No edema; warm and well perfused    Telemetry: afib    Laboratory Data:                        14.5   7.25  )-----------( 224      ( 2022 09:49 )             44.7         143  |  104  |  46<H>  ----------------------------<  109<H>  4.2   |  18<L>  |  1.76<H>    Ca    9.3      2022 06:56                Inpatient Medications:  MEDICATIONS  (STANDING):  aMIOdarone    Tablet 400 milliGRAM(s) Oral two times a day  apixaban 10 milliGRAM(s) Oral every 12 hours  artificial  tears Solution 1 Drop(s) Both EYES four times a day  erythromycin   Ointment 1 Application(s) Both EYES at bedtime  melatonin 3 milliGRAM(s) Oral at bedtime  metoprolol succinate ER 25 milliGRAM(s) Oral daily  QUEtiapine 25 milliGRAM(s) Oral at bedtime  sodium chloride 0.9%. 1000 milliLiter(s) (50 mL/Hr) IV Continuous <Continuous>      Assessment: 81 M with pmhx of HTN HLD PVD. 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  BPH pw confusion.    Plan of Care:    #LV thrombus  - Pt found to have multiple LV thrombi on TTE  - Started on Eliquis (New studies shows same efficacy as Warfarin)  - TTE shows severely reduced LVEF at 15% with moderate MR, and severe LV and RV dysfunction.   - Given patients AMS/ dementia and advanced age, pt is currently not a candidate for ischemic evaluation with catheterization. Will continue to medically manage Mr. Black.   - Pt DNR    #Acute decompensated systolic heart failure  - Hold diuretics in setting of JEWEL and rising creatinine.  Resume once creat improves.   - Monitor I and O, replenish lytes as needed  - Troponins elevated 2/2 demand ischemia  - Medical management at this time.     #Afib  - Rate controlled.   - Continue eliquis  - Continue BB    #Acute renal failure  - Unknown baseline.   - Management as per nephro    #LLE DVT  - Eliquis  - Would consider heme/onc input for hypercoagulable workup given history of DVTs in the past     #HTN  BP acceptable  - Continue BB    #HLD  Statin therapy    #PVCs  Multifocal PVCs  BB started  Started on amiodarone   Maintain K around 4 and Mg >2.   Not on AICD candidate.   EP input reviewed and appreciated.       #ACP (advance care planning)-  Advanced care planning was addressed. At this time will continue with conservative cardiac management. Risks, benefits and alternatives of medical treatment and procedures were addressed. 30 minutes spent addressing advance care plans.          Over 25 minutes spent on total encounter; more than 50% of the visit was spent counseling and/or coordinating care by the attending physician.      Jacky Mccarthy D.O.   Cardiovascular Disease  [phone]

## 2022-01-04 NOTE — DIETITIAN INITIAL EVALUATION ADULT. - REASON
Unable to obtain patient consent due to altered mental status, patient asleep. No visual signs of muscle/fat depletion, nutrition focused physical exam deferred at this time.

## 2022-01-05 ENCOUNTER — TRANSCRIPTION ENCOUNTER (OUTPATIENT)
Age: 82
End: 2022-01-05

## 2022-01-05 LAB
ANION GAP SERPL CALC-SCNC: 21 MMOL/L — HIGH (ref 5–17)
BUN SERPL-MCNC: 48 MG/DL — HIGH (ref 7–23)
CALCIUM SERPL-MCNC: 9.2 MG/DL — SIGNIFICANT CHANGE UP (ref 8.4–10.5)
CHLORIDE SERPL-SCNC: 102 MMOL/L — SIGNIFICANT CHANGE UP (ref 96–108)
CO2 SERPL-SCNC: 19 MMOL/L — LOW (ref 22–31)
CREAT SERPL-MCNC: 1.44 MG/DL — HIGH (ref 0.5–1.3)
GLUCOSE SERPL-MCNC: 118 MG/DL — HIGH (ref 70–99)
POTASSIUM SERPL-MCNC: 3.5 MMOL/L — SIGNIFICANT CHANGE UP (ref 3.5–5.3)
POTASSIUM SERPL-SCNC: 3.5 MMOL/L — SIGNIFICANT CHANGE UP (ref 3.5–5.3)
SODIUM SERPL-SCNC: 142 MMOL/L — SIGNIFICANT CHANGE UP (ref 135–145)

## 2022-01-05 RX ORDER — AMIODARONE HYDROCHLORIDE 400 MG/1
200 TABLET ORAL DAILY
Refills: 0 | Status: DISCONTINUED | OUTPATIENT
Start: 2022-01-05 | End: 2022-01-06

## 2022-01-05 RX ADMIN — Medication 1 APPLICATION(S): at 22:04

## 2022-01-05 RX ADMIN — Medication 3 MILLIGRAM(S): at 21:59

## 2022-01-05 RX ADMIN — AMIODARONE HYDROCHLORIDE 200 MILLIGRAM(S): 400 TABLET ORAL at 14:10

## 2022-01-05 RX ADMIN — Medication 25 MILLIGRAM(S): at 18:33

## 2022-01-05 RX ADMIN — QUETIAPINE FUMARATE 25 MILLIGRAM(S): 200 TABLET, FILM COATED ORAL at 21:59

## 2022-01-05 RX ADMIN — APIXABAN 10 MILLIGRAM(S): 2.5 TABLET, FILM COATED ORAL at 18:33

## 2022-01-05 RX ADMIN — Medication 25 MILLIGRAM(S): at 05:23

## 2022-01-05 RX ADMIN — APIXABAN 10 MILLIGRAM(S): 2.5 TABLET, FILM COATED ORAL at 05:23

## 2022-01-05 RX ADMIN — Medication 1 DROP(S): at 14:10

## 2022-01-05 RX ADMIN — AMIODARONE HYDROCHLORIDE 400 MILLIGRAM(S): 400 TABLET ORAL at 05:23

## 2022-01-05 RX ADMIN — Medication 1 DROP(S): at 18:33

## 2022-01-05 RX ADMIN — Medication 1 DROP(S): at 05:26

## 2022-01-05 NOTE — PROGRESS NOTE ADULT - SUBJECTIVE AND OBJECTIVE BOX
afebrile    REVIEW OF SYSTEMS:  GEN: no fever,    no chills  RESP: no SOB,   no cough  CVS: no chest pain,   no palpitations  GI: no abdominal pain,   no nausea,   no vomiting,   no constipation,   no diarrhea  : no dysuria,   no frequency  NEURO: no headache,   no dizziness  PSYCH: no depression,   not anxious  Derm : no rash    MEDICATIONS  (STANDING):  aMIOdarone    Tablet 400 milliGRAM(s) Oral two times a day  apixaban 10 milliGRAM(s) Oral every 12 hours  artificial  tears Solution 1 Drop(s) Both EYES four times a day  erythromycin   Ointment 1 Application(s) Both EYES at bedtime  melatonin 3 milliGRAM(s) Oral at bedtime  metoprolol succinate ER 25 milliGRAM(s) Oral two times a day  QUEtiapine 25 milliGRAM(s) Oral at bedtime  sodium chloride 0.9%. 1000 milliLiter(s) (50 mL/Hr) IV Continuous <Continuous>    MEDICATIONS  (PRN):      Vital Signs Last 24 Hrs  T(C): 36.5 (05 Jan 2022 04:52), Max: 36.5 (05 Jan 2022 04:52)  T(F): 97.7 (05 Jan 2022 04:52), Max: 97.7 (05 Jan 2022 04:52)  HR: 74 (05 Jan 2022 05:21) (62 - 87)  BP: 114/77 (05 Jan 2022 05:21) (101/71 - 127/83)  BP(mean): --  RR: 19 (05 Jan 2022 07:51) (18 - 20)  SpO2: 98% (05 Jan 2022 04:52) (93% - 98%)  CAPILLARY BLOOD GLUCOSE        I&O's Summary    04 Jan 2022 07:01  -  05 Jan 2022 07:00  --------------------------------------------------------  IN: 0 mL / OUT: 550 mL / NET: -550 mL        PHYSICAL EXAM:  HEAD:  Atraumatic, Normocephalic  NECK: Supple, No   JVD  CHEST/LUNG:   no     rales,     no,    rhonchi  HEART: Regular rate and rhythm;         murmur  ABDOMEN: Soft, Nontender, ;   EXTREMITIES:    no    edema  NEUROLOGY:  alert    LABS:    01-05    142  |  102  |  48<H>  ----------------------------<  118<H>  3.5   |  19<L>  |  1.44<H>    Ca    9.2      05 Jan 2022 06:58  Mg     1.9     01-04                      Thyroid Stimulating Hormone, Serum: 3.03 uIU/mL (12-31 @ 08:46)          Consultant(s) Notes Reviewed:      Care Discussed with Consultants/Other Providers:

## 2022-01-05 NOTE — DISCHARGE NOTE PROVIDER - HOSPITAL COURSE
81 M     h/o  HTN HLD PVD.   DVT/PE  in 2004  and  in 2005,  not on AC since   TIA, CVA.  BPH, dementia     p/w confusion.  from  alzheimers  dementia     * LV thrombi on echo     was  on iv heparin,  now  on  eliquis    * cardiomyopathy,  echo, 15% with moderate MR, and severe LV and RV dysfunction.   -per  card, Given patients AMS/ dementia and advanced age, pt is currently not a candidate for ischemic evaluation with catheterization./ on me d rx  -*  elevated , Troponin  elevated 2/2 demand ischemia/ chf.  and not form mi  *    ckd  *  LLE   dvt/ of left pop and tibioperoneal V   history of DVTs in the past ,  hence will  need  life long a/c  * HTN/ sbp on lower  side   *   Alzheimer's   dementia    with advanced   dementia  seen by psuch  *  s/p WCT 20 beats,   on toprol   unable to titrate given low  sbp/ ep  called  *  afib  on tele,  on a/c  already  on daily  amio   * pt  with  ectropion  of  b/l lids    (   hospice eval/ pt  is non verbal,  advanced   dementia ,  functional  quadriplegia     care  cirtn  f/p  for  hospice   Sammi, , crt stable  at  1.4   cleraed  for   d/c /  hospice     Delaney, daughter .  wants   pt   dnr/dni , and  [facility] filled  requesting hospice eval  for services  pmd  dr garcia. alberto. in [facility]< from: TTE with Doppler (w/Cont) (12.30.21 @ 10:44) >  Conclusions:  1. Tethered mitral valve leaflets with normal opening.  Moderate mitral regurgitation.  2. Severe global left ventricular systolic dysfunction.  Endocardial visualization enhanced with intravenous  injection of Ultrasonic Enhancing Agent (Definity). At  least 2 Left ventricular thrombi seen in apex. The largest  measures 1.7 cm x 1.6cm.  3. Right ventricular enlargement with decreased right  ventricular systolic function.  4. Normal tricuspid valve. Moderate tricuspid  regurgitation.  5. Estimated pulmonary artery systolic pressure equals 57  mm Hg, assuming right atrial pressure equals 8 mm Hg,  consistent with moderate pulmonary pressures.  6. Agitated saline injection and color flow Doppler  demonstrates no evidence of a patent foramen ovale.   Patient is an 81y male w/ pmhx/ochx of htn, hld, dvt/pe (no longer on AC dxed 5-7 yrs ago), TIA, CVA, BPH, dementia admitted for confusion, LV thrombus and CHF management. Patient with severe LV dysfunction, not a candidate for AICD.  Not a candidate for aggressive measures, continue beta blocker. Presents with confusion, worsening alzheimers  dementia. Multiple LV thrombus/LLE DVT (below knee) s/p hep gtt, now on Eliquis. SOB/ CHF, s/p IV lasix; can resume PO lasix on discharge. JEWEL on CKD.  New onset AF on A/C, eliquis.  Patient  s/p cataract surgery wears glasses since then. 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 Patient found to have multiple LV thrombi on TTE. Started on Eliquis (New studies shows same efficacy as Warfarin), Continue load with 10mg then switch to 5mg. TTE shows severely reduced LVEF at 15% with moderate MR, and severe LV and RV dysfunction. Given patients AMS/ dementia and advanced age, pt is currently not a candidate for ischemic evaluation with catheterization. Will continue to medically manage Mr. Black. Family agreeable to hospice. Tele was d/bita. Patient is DNR. Now [facility] to [facility].

## 2022-01-05 NOTE — PROGRESS NOTE ADULT - SUBJECTIVE AND OBJECTIVE BOX
EP    PROGRESS  NOTE   ________________________________________________    CHIEF COMPLAINT:Patient is a 81y old  Male who presents with a chief complaint of AMS, SOB  x 1 week (04 Jan 2022 11:44)  no complain.  	  REVIEW OF SYSTEMS:  CONSTITUTIONAL: No fever, weight loss, or fatigue  EYES: No eye pain, visual disturbances, or discharge  ENT:  No difficulty hearing, tinnitus, vertigo; No sinus or throat pain  NECK: No pain or stiffness  RESPIRATORY: No cough, wheezing, chills or hemoptysis; No Shortness of Breath  CARDIOVASCULAR: No chest pain, palpitations, passing out, dizziness, or leg swelling  GASTROINTESTINAL: No abdominal or epigastric pain. No nausea, vomiting, or hematemesis; No diarrhea or constipation. No melena or hematochezia.  GENITOURINARY: No dysuria, frequency, hematuria, or incontinence  NEUROLOGICAL: No headaches, memory loss, loss of strength, numbness, or tremors  SKIN: No itching, burning, rashes, or lesions   LYMPH Nodes: No enlarged glands  ENDOCRINE: No heat or cold intolerance; No hair loss  MUSCULOSKELETAL: No joint pain or swelling; No muscle, back, or extremity pain  PSYCHIATRIC: No depression, anxiety, mood swings, or difficulty sleeping  HEME/LYMPH: No easy bruising, or bleeding gums  ALLERGY AND IMMUNOLOGIC: No hives or eczema	    [ ] All others negative	  [x ] Unable to obtain    PHYSICAL EXAM:  T(C): 36.5 (01-05-22 @ 04:52), Max: 36.5 (01-05-22 @ 04:52)  HR: 74 (01-05-22 @ 05:21) (62 - 87)  BP: 114/77 (01-05-22 @ 05:21) (101/71 - 127/83)  RR: 19 (01-05-22 @ 07:51) (18 - 20)  SpO2: 98% (01-05-22 @ 04:52) (93% - 98%)  Wt(kg): --  I&O's Summary    04 Jan 2022 07:01  -  05 Jan 2022 07:00  --------------------------------------------------------  IN: 0 mL / OUT: 550 mL / NET: -550 mL        Appearance: Normal	  HEENT:   Normal oral mucosa, PERRL, EOMI	  Lymphatic: No lymphadenopathy  Cardiovascular: Normal S1 S2, No JVD, + murmurs, No edema  Respiratory: Lungs clear to auscultation	  Psychiatry: A & O x 3, Mood & affect appropriate  Gastrointestinal:  Soft, Non-tender, + BS	  Skin: No rashes, No ecchymoses, No cyanosis	  Neurologic: Non-focal  Extremities: Normal range of motion, No clubbing, cyanosis or edema  Vascular: Peripheral pulses palpable 2+ bilaterally    MEDICATIONS  (STANDING):  aMIOdarone    Tablet 400 milliGRAM(s) Oral two times a day  apixaban 10 milliGRAM(s) Oral every 12 hours  artificial  tears Solution 1 Drop(s) Both EYES four times a day  erythromycin   Ointment 1 Application(s) Both EYES at bedtime  melatonin 3 milliGRAM(s) Oral at bedtime  metoprolol succinate ER 25 milliGRAM(s) Oral two times a day  QUEtiapine 25 milliGRAM(s) Oral at bedtime  sodium chloride 0.9%. 1000 milliLiter(s) (50 mL/Hr) IV Continuous <Continuous>      TELEMETRY: 	    ECG:  	  RADIOLOGY:  OTHER: 	  	  LABS:	 	    CARDIAC MARKERS:            01-05    142  |  102  |  48<H>  ----------------------------<  118<H>  3.5   |  19<L>  |  1.44<H>    Ca    9.2      05 Jan 2022 06:58  Mg     1.9     01-04      proBNP: Serum Pro-Brain Natriuretic Peptide: 79687 pg/mL (12-30 @ 01:37)    Lipid Profile:   HgA1c:   TSH: Thyroid Stimulating Hormone, Serum: 3.03 uIU/mL (12-31 @ 08:46)    HIV-1/2 Antigen/Antibody Screen by [facility] (01.03.22 @ 09:24)    HIV-1/2 Combo Result: Nonreact: The HIV Ag/Ab Combo test performed screens for HIV-1 p24 antigen,  antibodies to HIV-1 (group M and group O), and antibodies to HIV-2. All  specimens repeatedly reactive will reflex to an HIV 1/2 antibody  confirmation and differentiation test. This assay detects p24 antigen  which may be present prior to the development of HIV antibodies,  therefore a reactive result with a negative HIV 1/2 AB Confirmation  should be followed up with HIV-1 RNA, HIV-2 RNA and repeat testing in 4-8  weeks. A nonreactive result does not preclude previous exposure to or  infection with HIV-1 or HIV-2.          Assessment and plan  ---------------------------  80 yo hx htn, hld, 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  BPH pw confusion. Obtained history from patients daughter Chika - Patient noted to have shortness of breath   noted to have dyspnea on exertion associated with cough tested for Covid negative pmd s office. Unable to obtain review of systems due to patient condition. Patient poor historian.   Patient  s/p cataract surgery wears glasses since then.  pt with severe LV dysfunction etiology, ischemia needs to be ruled out not a candidate for aggressive measures  continue beta blocker  keep k>4  continue iv heparin, switched to eliquis  not a candidate for AICD  tsh  magnesium level  fu lytes closely  hiv/spep/upep/ferritin ?cause of cardiomyopathy  tele much improved  hydralazine and nitrate if bp tolerates  pt is DNR  increase beta blocker as tolerated, switched to bid  tele was yennifer d    	                    EP    PROGRESS  NOTE   ________________________________________________    CHIEF COMPLAINT:Patient is a 81y old  Male who presents with a chief complaint of AMS, SOB  x 1 week (04 Jan 2022 11:44)  no complain.  	  REVIEW OF SYSTEMS:  CONSTITUTIONAL: No fever, weight loss, or fatigue  EYES: No eye pain, visual disturbances, or discharge  ENT:  No difficulty hearing, tinnitus, vertigo; No sinus or throat pain  NECK: No pain or stiffness  RESPIRATORY: No cough, wheezing, chills or hemoptysis; No Shortness of Breath  CARDIOVASCULAR: No chest pain, palpitations, passing out, dizziness, or leg swelling  GASTROINTESTINAL: No abdominal or epigastric pain. No nausea, vomiting, or hematemesis; No diarrhea or constipation. No melena or hematochezia.  GENITOURINARY: No dysuria, frequency, hematuria, or incontinence  NEUROLOGICAL: No headaches, memory loss, loss of strength, numbness, or tremors  SKIN: No itching, burning, rashes, or lesions   LYMPH Nodes: No enlarged glands  ENDOCRINE: No heat or cold intolerance; No hair loss  MUSCULOSKELETAL: No joint pain or swelling; No muscle, back, or extremity pain  PSYCHIATRIC: No depression, anxiety, mood swings, or difficulty sleeping  HEME/LYMPH: No easy bruising, or bleeding gums  ALLERGY AND IMMUNOLOGIC: No hives or eczema	    [ ] All others negative	  [x ] Unable to obtain    PHYSICAL EXAM:  T(C): 36.5 (01-05-22 @ 04:52), Max: 36.5 (01-05-22 @ 04:52)  HR: 74 (01-05-22 @ 05:21) (62 - 87)  BP: 114/77 (01-05-22 @ 05:21) (101/71 - 127/83)  RR: 19 (01-05-22 @ 07:51) (18 - 20)  SpO2: 98% (01-05-22 @ 04:52) (93% - 98%)  Wt(kg): --  I&O's Summary    04 Jan 2022 07:01  -  05 Jan 2022 07:00  --------------------------------------------------------  IN: 0 mL / OUT: 550 mL / NET: -550 mL        Appearance: Normal	  HEENT:   Normal oral mucosa, PERRL, EOMI	  Lymphatic: No lymphadenopathy  Cardiovascular: Normal S1 S2, No JVD, + murmurs, No edema  Respiratory: Lungs clear to auscultation	  Psychiatry: A & O x 3, Mood & affect appropriate  Gastrointestinal:  Soft, Non-tender, + BS	  Skin: No rashes, No ecchymoses, No cyanosis	  Neurologic: Non-focal  Extremities: Normal range of motion, No clubbing, cyanosis or edema  Vascular: Peripheral pulses palpable 2+ bilaterally    MEDICATIONS  (STANDING):  aMIOdarone    Tablet 400 milliGRAM(s) Oral two times a day  apixaban 10 milliGRAM(s) Oral every 12 hours  artificial  tears Solution 1 Drop(s) Both EYES four times a day  erythromycin   Ointment 1 Application(s) Both EYES at bedtime  melatonin 3 milliGRAM(s) Oral at bedtime  metoprolol succinate ER 25 milliGRAM(s) Oral two times a day  QUEtiapine 25 milliGRAM(s) Oral at bedtime  sodium chloride 0.9%. 1000 milliLiter(s) (50 mL/Hr) IV Continuous <Continuous>      TELEMETRY: 	    ECG:  	  RADIOLOGY:  OTHER: 	  	  LABS:	 	    CARDIAC MARKERS:            01-05    142  |  102  |  48<H>  ----------------------------<  118<H>  3.5   |  19<L>  |  1.44<H>    Ca    9.2      05 Jan 2022 06:58  Mg     1.9     01-04      proBNP: Serum Pro-Brain Natriuretic Peptide: 75296 pg/mL (12-30 @ 01:37)    Lipid Profile:   HgA1c:   TSH: Thyroid Stimulating Hormone, Serum: 3.03 uIU/mL (12-31 @ 08:46)    HIV-1/2 Antigen/Antibody Screen by [facility] (01.03.22 @ 09:24)    HIV-1/2 Combo Result: Nonreact: The HIV Ag/Ab Combo test performed screens for HIV-1 p24 antigen,  antibodies to HIV-1 (group M and group O), and antibodies to HIV-2. All  specimens repeatedly reactive will reflex to an HIV 1/2 antibody  confirmation and differentiation test. This assay detects p24 antigen  which may be present prior to the development of HIV antibodies,  therefore a reactive result with a negative HIV 1/2 AB Confirmation  should be followed up with HIV-1 RNA, HIV-2 RNA and repeat testing in 4-8  weeks. A nonreactive result does not preclude previous exposure to or  infection with HIV-1 or HIV-2.          Assessment and plan  ---------------------------  80 yo hx htn, hld, 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  BPH pw confusion. Obtained history from patients daughter Chika - Patient noted to have shortness of breath   noted to have dyspnea on exertion associated with cough tested for Covid negative pmd s office. Unable to obtain review of systems due to patient condition. Patient poor historian.   Patient  s/p cataract surgery wears glasses since then.  pt with severe LV dysfunction etiology, ischemia needs to be ruled out not a candidate for aggressive measures  continue beta blocker  keep k>4  continue iv heparin, switched to eliquis  not a candidate for AICD  tsh  magnesium level  fu lytes closely  hiv/spep/upep/ferritin ?cause of cardiomyopathy  tele much improved  hydralazine and nitrate if bp tolerates  pt is DNR  increase beta blocker as tolerated, switched to bid  tele was dc d, if pt or afmily  does not want any medical therapy we should [possibly stop cardiac meds and transfer to Palliative care

## 2022-01-05 NOTE — PROGRESS NOTE ADULT - ASSESSMENT
81 M     h/o  HTN HLD PVD.   DVT/PE  in 2004  and  in 2005,  not on AC since   TIA, CVA.  BPH, dementia     p/w confusion.  from  alzheimers  dementia     * LV thrombi on echo     was  on iv heparin,  now  on  eliquis    * cardiomyopathy,  echo, 15% with moderate MR, and severe LV and RV dysfunction.   -per  card, Given patients AMS/ dementia and advanced age, pt is currently not a candidate for ischemic evaluation with catheterization./ on me d rx  *  Acute decompensated systolic heart failure  on iv lasix  -*  elevated , Troponin  elevated 2/2 demand ischemia/ chf.  and not form mi  *  ?  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 on toprol   unable to titrate given low  sbp/ ep  called  *  afib  on tele,  on a/c  already  pt  with  ectropion  of  b/l lower  lids.     awaiting  hospice eval/ pt  is non verbal,  advanced   dementia ,  functional  quadriplegia     care  cirtn  f/p  for  hospice   Sammi, , crt stable  at  1.4   cleraed  for   d/c /  hospice     Delaney, daughter .  wants   pt   dnr/dni , and  Molst filled  requesting hospice eval  for services  pmd  dr garcia. alberto. in [facility]< from: TTE with Doppler (w/Cont) (12.30.21 @ 10:44) >  Conclusions:  1. Tethered mitral valve leaflets with normal opening.  Moderate mitral regurgitation.  2. Severe global left ventricular systolic dysfunction.  Endocardial visualization enhanced with intravenous  injection of Ultrasonic Enhancing Agent (Definity). At  least 2 Left ventricular thrombi seen in apex. The largest  measures 1.7 cm x 1.6cm.  3. Right ventricular enlargement with decreased right  ventricular systolic function.  4. Normal tricuspid valve. Moderate tricuspid  regurgitation.  5. Estimated pulmonary artery systolic pressure equals 57  mm Hg, assuming right atrial pressure equals 8 mm Hg,  consistent with moderate pulmonary pressures.  6. Agitated saline injection and color flow Doppler  demonstrates no evidence of a patent foramen ovale.  *** No previous Echo exam.  Findings Discussed With Dr. Nelda Liriano  ------------------------------------------------------------------------  < end of copied text >       rad< from: [facility] Duplex Lower Ext Vein Scan, Bilat (12.30.21 @ 05:45) >  Deep venous thrombosis in the left poplitealvein and the tibioperoneal   trunk with partial occlusion. There is flow within the vein adjacent to   the clot. The clot does not appear to be echogenic.  Doppler examination shows flow.  Limited visualization of the left calf veins. The peroneal and soleal   veins appear patent.  IMPRESSION:  Nonocclusive thrombus below the knee in the left leg of indeterminate age.  Dr Neri was given results at the time of the study with read back   confirmation. Findings also discussed with Dr Duarte by Dr Chavez at 523   AM on 12/30/21.  --- End of Report --  < end of copied text >         81 M     h/o  HTN HLD PVD.   DVT/PE  in 2004  and  in 2005,  not on AC since   TIA, CVA.  BPH, dementia     p/w confusion.  from  alzheimers  dementia     * LV thrombi on echo     was  on iv heparin,  now  on  eliquis    * cardiomyopathy,  echo, 15% with moderate MR, and severe LV and RV dysfunction.   -per  card, Given patients AMS/ dementia and advanced age, pt is currently not a candidate for ischemic evaluation with catheterization./ on me d rx  -*  elevated , Troponin  elevated 2/2 demand ischemia/ chf.  and not form mi  *    ckd    baseline unknown  *  LLE   dvt/ of left pop and tibioperoneal V   history of DVTs in the past ,  hence will  need  life long a/c  * HTN/ sbp on lower  side   *   Alzheimer's   dementia    with advanced   dementia  seen by psuch  *  s/p WCT 20 beats,   on toprol   unable to titrate given low  sbp/ ep  called  *  afib  on tele,  on a/c  already  on daily  amio   * pt  with  ectropion  of  b/l lids    (   hospice eval/ pt  is non verbal,  advanced   dementia ,  functional  quadriplegia     care  cirtn  f/p  for  hospice   Sammi, , crt stable  at  1.4   cleraed  for   d/c /  hospice     Delaney, daughter .  wants   pt   dnr/dni , and  Molst filled  requesting hospice eval  for services  pmd  dr garcia. alberto. in [facility]< from: TTE with Doppler (w/Cont) (12.30.21 @ 10:44) >  Conclusions:  1. Tethered mitral valve leaflets with normal opening.  Moderate mitral regurgitation.  2. Severe global left ventricular systolic dysfunction.  Endocardial visualization enhanced with intravenous  injection of Ultrasonic Enhancing Agent (Definity). At  least 2 Left ventricular thrombi seen in apex. The largest  measures 1.7 cm x 1.6cm.  3. Right ventricular enlargement with decreased right  ventricular systolic function.  4. Normal tricuspid valve. Moderate tricuspid  regurgitation.  5. Estimated pulmonary artery systolic pressure equals 57  mm Hg, assuming right atrial pressure equals 8 mm Hg,  consistent with moderate pulmonary pressures.  6. Agitated saline injection and color flow Doppler  demonstrates no evidence of a patent foramen ovale.  *** No previous Echo exam.  Findings Discussed With Dr. Nelda Liriano  ------------------------------------------------------------------------  < end of copied text >       rad< from: [facility] Duplex Lower Ext Vein Scan, Bilat (12.30.21 @ 05:45) >  Deep venous thrombosis in the left poplitealvein and the tibioperoneal   trunk with partial occlusion. There is flow within the vein adjacent to   the clot. The clot does not appear to be echogenic.  Doppler examination shows flow.  Limited visualization of the left calf veins. The peroneal and soleal   veins appear patent.  IMPRESSION:  Nonocclusive thrombus below the knee in the left leg of indeterminate age.  Dr Neri was given results at the time of the study with read back   confirmation. Findings also discussed with Dr Duarte by Dr Chavez at 523   AM on 12/30/21.  --- End of Report --  < end of copied text >

## 2022-01-05 NOTE — DISCHARGE NOTE PROVIDER - NSDCMRMEDTOKEN_GEN_ALL_CORE_FT
amiodarone 200 mg oral tablet: 1 tab(s) orally once a day  apixaban 5 mg oral tablet: Take 2 tab(s) orally every 12 hours for 3 days ending on 1/9/22.   Then start taking 1 tab twice daily, starting on 1/10/22.  Bion Tears ophthalmic solution: 1 drop(s) to each affected eye 4 times a day  erythromycin 0.5% ophthalmic ointment: 1 application to each affected eye once a day (at bedtime)  melatonin 3 mg oral tablet: 1 tab(s) orally once a day (at bedtime)  metoprolol succinate 25 mg oral tablet, extended release: 1 tab(s) orally 2 times a day  QUEtiapine 25 mg oral tablet: 1 tab(s) orally once a day (at bedtime)

## 2022-01-05 NOTE — PROGRESS NOTE ADULT - SUBJECTIVE AND OBJECTIVE BOX
Cardiovascular Disease Progress Note    Overnight events: No acute events overnight.  Pt family agreeable to home hospice. ROS unable to be obtained 2/2 dementia.   Otherwise review of systems negative    Objective Findings:  T(C): 36.5 (01-05-22 @ 04:52), Max: 36.5 (01-05-22 @ 04:52)  HR: 74 (01-05-22 @ 05:21) (62 - 87)  BP: 114/77 (01-05-22 @ 05:21) (101/71 - 127/83)  RR: 19 (01-05-22 @ 07:51) (18 - 20)  SpO2: 98% (01-05-22 @ 04:52) (93% - 98%)  Wt(kg): --  Daily     Daily       Physical Exam:  Gen: NAD; Patient resting comfortably  HEENT: EOMI, Erythema, blepharitis bilaterally  CV: Irregular rhythm, normal S1 + S2, no m/r/g  Lungs:  Normal respiratory effort; clear to auscultation bilaterally  Abd: soft, non-tender; bowel sounds present  Ext: No edema; warm and well perfused    Telemetry: N/a    Laboratory Data:    01-05    142  |  102  |  48<H>  ----------------------------<  118<H>  3.5   |  19<L>  |  1.44<H>    Ca    9.2      05 Jan 2022 06:58  Mg     1.9     01-04                Inpatient Medications:  MEDICATIONS  (STANDING):  aMIOdarone    Tablet 200 milliGRAM(s) Oral daily  apixaban 10 milliGRAM(s) Oral every 12 hours  artificial  tears Solution 1 Drop(s) Both EYES four times a day  erythromycin   Ointment 1 Application(s) Both EYES at bedtime  melatonin 3 milliGRAM(s) Oral at bedtime  metoprolol succinate ER 25 milliGRAM(s) Oral two times a day  QUEtiapine 25 milliGRAM(s) Oral at bedtime  sodium chloride 0.9%. 1000 milliLiter(s) (50 mL/Hr) IV Continuous <Continuous>      Assessment:  81 M with pmhx of HTN HLD PVD. 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  BPH pw confusion.    Plan of Care:    #LV thrombus  - Pt found to have multiple LV thrombi on TTE  - Started on Eliquis (New studies shows same efficacy as Warfarin), Continue load with 10mg then switch to 5mg  - TTE shows severely reduced LVEF at 15% with moderate MR, and severe LV and RV dysfunction.   - Given patients AMS/ dementia and advanced age, pt is currently not a candidate for ischemic evaluation with catheterization. Will continue to medically manage Mr. Black.   - Pt DNR  - Famile agreeable to hospice. To be discharged on home hospice.     #Acute decompensated systolic heart failure  - Resume PO diuretics upon discharge.   - Monitor I and O, replenish lytes as needed  - Troponins elevated 2/2 demand ischemia  - Medical management at this time.     #Afib  - Rate controlled.   - Continue eliquis  - Continue BB    #Acute renal failure  - Unknown baseline.   - Management as per nephro    #LLE DVT  - Eliquis  - Would consider heme/onc input for hypercoagulable workup given history of DVTs in the past     #HTN  BP acceptable  - Continue BB    #HLD  Statin therapy    #PVCs  Multifocal PVCs  BB started  Started on amiodarone   Maintain K around 4 and Mg >2.   Not on AICD candidate.   EP input reviewed and appreciated.       #ACP (advance care planning)-  Advanced care planning was addressed. Mr. Black will be discharged on home hospice. 30 minutes spent addressing advance care plans.    Plan of Care:          Over 25 minutes spent on total encounter; more than 50% of the visit was spent counseling and/or coordinating care by the attending physician.      Jacky Mccarthy D.O.   Cardiovascular Disease  [phone]

## 2022-01-06 ENCOUNTER — TRANSCRIPTION ENCOUNTER (OUTPATIENT)
Age: 82
End: 2022-01-06

## 2022-01-06 VITALS
DIASTOLIC BLOOD PRESSURE: 82 MMHG | RESPIRATION RATE: 18 BRPM | OXYGEN SATURATION: 95 % | SYSTOLIC BLOOD PRESSURE: 127 MMHG | HEART RATE: 84 BPM

## 2022-01-06 LAB — SARS-COV-2 RNA SPEC QL NAA+PROBE: SIGNIFICANT CHANGE UP

## 2022-01-06 PROCEDURE — 85025 COMPLETE CBC W/AUTO DIFF WBC: CPT

## 2022-01-06 PROCEDURE — 85027 COMPLETE CBC AUTOMATED: CPT

## 2022-01-06 PROCEDURE — 87389 HIV-1 AG W/HIV-1&-2 AB AG IA: CPT

## 2022-01-06 PROCEDURE — 71045 X-RAY EXAM CHEST 1 VIEW: CPT

## 2022-01-06 PROCEDURE — C8929: CPT

## 2022-01-06 PROCEDURE — 84484 ASSAY OF TROPONIN QUANT: CPT

## 2022-01-06 PROCEDURE — 93970 EXTREMITY STUDY: CPT

## 2022-01-06 PROCEDURE — 82607 VITAMIN B-12: CPT

## 2022-01-06 PROCEDURE — U0003: CPT

## 2022-01-06 PROCEDURE — 84100 ASSAY OF PHOSPHORUS: CPT

## 2022-01-06 PROCEDURE — 85610 PROTHROMBIN TIME: CPT

## 2022-01-06 PROCEDURE — 99285 EMERGENCY DEPT VISIT HI MDM: CPT | Mod: 25

## 2022-01-06 PROCEDURE — 36415 COLL VENOUS BLD VENIPUNCTURE: CPT

## 2022-01-06 PROCEDURE — 82728 ASSAY OF FERRITIN: CPT

## 2022-01-06 PROCEDURE — 84165 PROTEIN E-PHORESIS SERUM: CPT

## 2022-01-06 PROCEDURE — 96374 THER/PROPH/DIAG INJ IV PUSH: CPT

## 2022-01-06 PROCEDURE — 82962 GLUCOSE BLOOD TEST: CPT

## 2022-01-06 PROCEDURE — 97162 PT EVAL MOD COMPLEX 30 MIN: CPT

## 2022-01-06 PROCEDURE — 70450 CT HEAD/BRAIN W/O DYE: CPT | Mod: MA

## 2022-01-06 PROCEDURE — 97116 GAIT TRAINING THERAPY: CPT

## 2022-01-06 PROCEDURE — 85730 THROMBOPLASTIN TIME PARTIAL: CPT

## 2022-01-06 PROCEDURE — U0005: CPT

## 2022-01-06 PROCEDURE — 80048 BASIC METABOLIC PNL TOTAL CA: CPT

## 2022-01-06 PROCEDURE — 71275 CT ANGIOGRAPHY CHEST: CPT | Mod: MA

## 2022-01-06 PROCEDURE — 96375 TX/PRO/DX INJ NEW DRUG ADDON: CPT

## 2022-01-06 PROCEDURE — 83880 ASSAY OF NATRIURETIC PEPTIDE: CPT

## 2022-01-06 PROCEDURE — 84443 ASSAY THYROID STIM HORMONE: CPT

## 2022-01-06 PROCEDURE — 87635 SARS-COV-2 COVID-19 AMP PRB: CPT

## 2022-01-06 PROCEDURE — 83605 ASSAY OF LACTIC ACID: CPT

## 2022-01-06 PROCEDURE — 83735 ASSAY OF MAGNESIUM: CPT

## 2022-01-06 PROCEDURE — 80053 COMPREHEN METABOLIC PANEL: CPT

## 2022-01-06 PROCEDURE — 84155 ASSAY OF PROTEIN SERUM: CPT

## 2022-01-06 PROCEDURE — 93005 ELECTROCARDIOGRAM TRACING: CPT

## 2022-01-06 PROCEDURE — 97530 THERAPEUTIC ACTIVITIES: CPT

## 2022-01-06 PROCEDURE — 82746 ASSAY OF FOLIC ACID SERUM: CPT

## 2022-01-06 RX ORDER — ERYTHROMYCIN BASE 5 MG/GRAM
1 OINTMENT (GRAM) OPHTHALMIC (EYE)
Qty: 15 | Refills: 0
Start: 2022-01-06 | End: 2022-02-04

## 2022-01-06 RX ORDER — AMIODARONE HYDROCHLORIDE 400 MG/1
1 TABLET ORAL
Qty: 30 | Refills: 0
Start: 2022-01-06 | End: 2022-02-04

## 2022-01-06 RX ORDER — APIXABAN 2.5 MG/1
1 TABLET, FILM COATED ORAL
Qty: 0 | Refills: 0 | DISCHARGE
Start: 2022-01-06

## 2022-01-06 RX ORDER — FUROSEMIDE 40 MG
1 TABLET ORAL
Qty: 30 | Refills: 0
Start: 2022-01-06 | End: 2022-02-04

## 2022-01-06 RX ORDER — METOPROLOL TARTRATE 50 MG
1 TABLET ORAL
Qty: 60 | Refills: 0
Start: 2022-01-06 | End: 2022-02-04

## 2022-01-06 RX ORDER — QUETIAPINE FUMARATE 200 MG/1
1 TABLET, FILM COATED ORAL
Qty: 0 | Refills: 0 | DISCHARGE
Start: 2022-01-06

## 2022-01-06 RX ORDER — APIXABAN 2.5 MG/1
2 TABLET, FILM COATED ORAL
Qty: 16 | Refills: 0
Start: 2022-01-06 | End: 2022-01-09

## 2022-01-06 RX ORDER — APIXABAN 2.5 MG/1
2 TABLET, FILM COATED ORAL
Qty: 120 | Refills: 0
Start: 2022-01-06 | End: 2022-02-04

## 2022-01-06 RX ORDER — AMIODARONE HYDROCHLORIDE 400 MG/1
1 TABLET ORAL
Qty: 0 | Refills: 0 | DISCHARGE
Start: 2022-01-06

## 2022-01-06 RX ORDER — FUROSEMIDE 40 MG
20 TABLET ORAL DAILY
Refills: 0 | Status: DISCONTINUED | OUTPATIENT
Start: 2022-01-06 | End: 2022-01-06

## 2022-01-06 RX ORDER — METOPROLOL TARTRATE 50 MG
1 TABLET ORAL
Qty: 0 | Refills: 0 | DISCHARGE
Start: 2022-01-06

## 2022-01-06 RX ORDER — AMIODARONE HYDROCHLORIDE 400 MG/1
200 TABLET ORAL DAILY
Refills: 0 | Status: DISCONTINUED | OUTPATIENT
Start: 2022-01-06 | End: 2022-01-06

## 2022-01-06 RX ORDER — ERYTHROMYCIN BASE 5 MG/GRAM
1 OINTMENT (GRAM) OPHTHALMIC (EYE)
Qty: 0 | Refills: 0 | DISCHARGE
Start: 2022-01-06

## 2022-01-06 RX ORDER — QUETIAPINE FUMARATE 200 MG/1
1 TABLET, FILM COATED ORAL
Qty: 30 | Refills: 0
Start: 2022-01-06 | End: 2022-02-04

## 2022-01-06 RX ORDER — LANOLIN ALCOHOL/MO/W.PET/CERES
1 CREAM (GRAM) TOPICAL
Qty: 0 | Refills: 0 | DISCHARGE
Start: 2022-01-06

## 2022-01-06 RX ORDER — APIXABAN 2.5 MG/1
2 TABLET, FILM COATED ORAL
Qty: 0 | Refills: 0 | DISCHARGE
Start: 2022-01-06

## 2022-01-06 RX ORDER — LANOLIN ALCOHOL/MO/W.PET/CERES
1 CREAM (GRAM) TOPICAL
Qty: 30 | Refills: 0
Start: 2022-01-06 | End: 2022-02-04

## 2022-01-06 RX ADMIN — Medication 1 DROP(S): at 05:18

## 2022-01-06 RX ADMIN — APIXABAN 10 MILLIGRAM(S): 2.5 TABLET, FILM COATED ORAL at 05:18

## 2022-01-06 RX ADMIN — Medication 25 MILLIGRAM(S): at 05:18

## 2022-01-06 RX ADMIN — Medication 100 MILLIGRAM(S): at 00:20

## 2022-01-06 RX ADMIN — AMIODARONE HYDROCHLORIDE 200 MILLIGRAM(S): 400 TABLET ORAL at 05:18

## 2022-01-06 RX ADMIN — Medication 1 DROP(S): at 11:07

## 2022-01-06 RX ADMIN — Medication 20 MILLIGRAM(S): at 15:30

## 2022-01-06 RX ADMIN — AMIODARONE HYDROCHLORIDE 200 MILLIGRAM(S): 400 TABLET ORAL at 15:30

## 2022-01-06 RX ADMIN — Medication 1 DROP(S): at 00:56

## 2022-01-06 NOTE — PROGRESS NOTE ADULT - SUBJECTIVE AND OBJECTIVE BOX
Cardiovascular Disease Progress Note    Overnight events: No acute events overnight.  Pt supposed to be discharged home yesterday however, hopce care was not set up in time. Plan for discharge today. Pt resting in bed with 1:1. ROS unable to be obtained 2/2 dementia.   Otherwise review of systems negative    Objective Findings:  T(C): 36.4 (22 @ 05:13), Max: 36.9 (22 @ 13:59)  HR: 63 (22 @ 05:13) (58 - 71)  BP: 117/53 (22 @ 05:13) (99/72 - 131/78)  RR: 18 (22 @ 07:30) (16 - 18)  SpO2: 97% (22 @ 05:13) (96% - 98%)  Wt(kg): --  Daily     Daily Weight in k.2 (2022 08:58)      Physical Exam:  Gen: NAD; Patient resting comfortably  HEENT: EOMI, Normocephalic/ atraumatic  CV: RRR, normal S1 + S2, no m/r/g  Lungs:  Normal respiratory effort; clear to auscultation bilaterally  Abd: soft, non-tender; bowel sounds present  Ext: No edema; warm and well perfused    Telemetry: Na    Laboratory Data:        142  |  102  |  48<H>  ----------------------------<  118<H>  3.5   |  19<L>  |  1.44<H>    Ca    9.2      2022 06:58                Inpatient Medications:  MEDICATIONS  (STANDING):  aMIOdarone    Tablet 200 milliGRAM(s) Oral daily  apixaban 10 milliGRAM(s) Oral every 12 hours  artificial  tears Solution 1 Drop(s) Both EYES four times a day  erythromycin   Ointment 1 Application(s) Both EYES at bedtime  melatonin 3 milliGRAM(s) Oral at bedtime  metoprolol succinate ER 25 milliGRAM(s) Oral two times a day  QUEtiapine 25 milliGRAM(s) Oral at bedtime  sodium chloride 0.9%. 1000 milliLiter(s) (50 mL/Hr) IV Continuous <Continuous>      Assessment:  81 M with pmhx of HTN HLD PVD. 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  BPH pw confusion.    Plan of Care:    #LV thrombus  - Pt found to have multiple LV thrombi on TTE  - Started on Eliquis (New studies shows same efficacy as Warfarin), Continue load with 10mg then switch to 5mg  - TTE shows severely reduced LVEF at 15% with moderate MR, and severe LV and RV dysfunction.   - Given patients AMS/ dementia and advanced age, pt is currently not a candidate for ischemic evaluation with catheterization. Will continue to medically manage Mr. Black.   - Pt DNR  - Famile agreeable to home hospice.     #Acute decompensated systolic heart failure  - Resume PO diuretics upon discharge.   - Monitor I and O, replenish lytes as needed  - Troponins elevated 2/2 demand ischemia  - Medical management at this time.     #Afib  - Rate controlled.   - management as per EPS    #Acute renal failure  - Unknown baseline.   - Management as per nephro    #LLE DVT  - Eliquis  - Would consider heme/onc input for hypercoagulable workup given history of DVTs in the past     #HTN  BP acceptable  - Continue BB    #HLD  Statin therapy    #PVCs  Multifocal PVCs  BB started  Started on amiodarone   Maintain K around 4 and Mg >2.   Not on AICD candidate.   EP input reviewed and appreciated.         Plan of Care:          Over 25 minutes spent on total encounter; more than 50% of the visit was spent counseling and/or coordinating care by the attending physician.      Jacky Mccarthy D.O.   Cardiovascular Disease  [phone]

## 2022-01-06 NOTE — PROGRESS NOTE ADULT - ASSESSMENT
81 M     h/o  HTN HLD PVD.   DVT/PE  in 2004  and  in 2005,  not on AC since   TIA, CVA.  BPH, dementia     p/w confusion.  from  alzheimers  dementia     * LV thrombi on echo     was  on iv heparin,  now  on  eliquis    * cardiomyopathy,  echo, 15% with moderate MR, and severe LV and RV dysfunction.   -per  card, Given patients AMS/ dementia and advanced age, pt is currently not a candidate for ischemic evaluation with catheterization./ on me d rx  -*  elevated , Troponin  elevated 2/2 demand ischemia/ chf.  and not form mi  *    ckd    baseline unknown  *  LLE   dvt/ of left pop and tibioperoneal V   history of DVTs in the past ,  hence will  need  life long a/c  * HTN/ sbp on lower  side   *   Alzheimer's   dementia    with advanced   dementia  seen by psuch  *  s/p WCT 20 beats,   on toprol   unable to titrate given low  sbp/ ep  called  *  afib  on tele,  on a/c  already  on daily  amio   * pt  with  ectropion  of  b/l lids      hospice eval/ pt  is non verbal,  advanced   dementia ,  functional  quadriplegia     care  cirtn  f/p  for  hospice   Sammi, , crt stable  at  1.4   cleraed  for   d/c /  hospice  still awaing   d/c,  team  aware     Delaney, daughter .  wants   pt   dnr/dni , and  [facility] filled  requesting hospice eval  for services  pmd  dr garcia. alberto. in [facility]< from: TTE with Doppler (w/Cont) (12.30.21 @ 10:44) >  Conclusions:  1. Tethered mitral valve leaflets with normal opening.  Moderate mitral regurgitation.  2. Severe global left ventricular systolic dysfunction.  Endocardial visualization enhanced with intravenous  injection of Ultrasonic Enhancing Agent (Definity). At  least 2 Left ventricular thrombi seen in apex. The largest  measures 1.7 cm x 1.6cm.  3. Right ventricular enlargement with decreased right  ventricular systolic function.  4. Normal tricuspid valve. Moderate tricuspid  regurgitation.  5. Estimated pulmonary artery systolic pressure equals 57  mm Hg, assuming right atrial pressure equals 8 mm Hg,  consistent with moderate pulmonary pressures.  6. Agitated saline injection and color flow Doppler  demonstrates no evidence of a patent foramen ovale.  *** No previous Echo exam.  Findings Discussed With Dr. Nelda Liriano  ------------------------------------------------------------------------  < end of copied text >       rad< from: [facility] Duplex Lower Ext Vein Scan, Bilat (12.30.21 @ 05:45) >  Deep venous thrombosis in the left poplitealvein and the tibioperoneal   trunk with partial occlusion. There is flow within the vein adjacent to   the clot. The clot does not appear to be echogenic.  Doppler examination shows flow.  Limited visualization of the left calf veins. The peroneal and soleal   veins appear patent.  IMPRESSION:  Nonocclusive thrombus below the knee in the left leg of indeterminate age.  Dr Neri was given results at the time of the study with read back   confirmation. Findings also discussed with Dr Duarte by Dr Chavez at 523   AM on 12/30/21.  --- End of Report --  < end of copied text >

## 2022-01-06 NOTE — DISCHARGE NOTE NURSING/CASE MANAGEMENT/SOCIAL WORK - NSDCPEFALRISK_GEN_ALL_CORE
For information on Fall & Injury Prevention, visit: [email or web address] OR  [email or web address] OR  https://www.cdc.gov/steadi/patient.html

## 2022-01-06 NOTE — PROGRESS NOTE ADULT - PROVIDER SPECIALTY LIST ADULT
Cardiology
Electrophysiology
Internal Medicine
Cardiology
Electrophysiology
Internal Medicine
Neurology
Cardiology
Electrophysiology
Ophthalmology
Internal Medicine

## 2022-01-06 NOTE — PROGRESS NOTE ADULT - ASSESSMENT
81y male w/ pmhx/ochx of htn, hld, dvt/pe (no longer on AC dxed 5-7 yrs ago), TIA, CVA, BPH, dementia admitted for confusion, LV thrombus and CHF management. 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    # ectropion both eyes  # blepharitis both eyes  - eye redness likely from ectropion and blepharitis causing dryness and crusting  - C/w artificial tears, one drop four times a day, to both eyes   - warm compresses BID-TID both eyes  - erythromycin ointment QHS both eyes for 7 days, then continue with lacrilube ointment QHS both eyes

## 2022-01-06 NOTE — PROGRESS NOTE ADULT - REASON FOR ADMISSION
AMS, SOB  x 1 week

## 2022-01-06 NOTE — PROGRESS NOTE ADULT - SUBJECTIVE AND OBJECTIVE BOX
[facility] DEPARTMENT OF OPHTHALMOLOGY  ------------------------------------------------------------------------------    Interval History: No acute events overnight. Pt unable to give history given poor mental status.     MEDICATIONS  (STANDING):  aMIOdarone    Tablet 200 milliGRAM(s) Oral daily  apixaban 10 milliGRAM(s) Oral every 12 hours  artificial  tears Solution 1 Drop(s) Both EYES four times a day  erythromycin   Ointment 1 Application(s) Both EYES at bedtime  melatonin 3 milliGRAM(s) Oral at bedtime  metoprolol succinate ER 25 milliGRAM(s) Oral two times a day  QUEtiapine 25 milliGRAM(s) Oral at bedtime  sodium chloride 0.9%. 1000 milliLiter(s) (50 mL/Hr) IV Continuous <Continuous>    MEDICATIONS  (PRN):      VITALS: T(C): 36.4 (01-06-22 @ 05:13)  T(F): 97.5 (01-06-22 @ 05:13), Max: 98.5 (01-05-22 @ 13:59)  HR: 63 (01-06-22 @ 05:13) (58 - 71)  BP: 117/53 (01-06-22 @ 05:13) (99/72 - 131/78)  RR:  (16 - 18)  SpO2:  (96% - 98%)  Wt(kg): --  General: AAO x 0-1, appropriate mood and affect    Ophthalmology Exam:  Visual acuity (sc): At least 20/400 OU, pt poorly cooperative with very poor mental status  Pupils: PERRL OU, no APD  Ttono: 16 OD, 15 OS  Extraocular movements (EOMs): Full OU  Confrontational Visual Field (CVF): RAJEEV 2/2 mental status    Pen Light Exam (PLE)  External: Flat OU  Lids/Lashes/Lacrimal Ducts: crusting OU. Lower lid ectropion with erythema OU  Sclera/Conjunctiva: W+Q OU  Cornea: DTBUT OU, mucoid discharge OU  Anterior Chamber: D+F OU    Iris: Flat OU  Lens: PCIOL OU

## 2022-01-06 NOTE — DISCHARGE NOTE NURSING/CASE MANAGEMENT/SOCIAL WORK - PATIENT PORTAL LINK FT
You can access the FollowMyHealth Patient Portal offered by [facility] by registering at the following website: [email or web address]. By joining [employer]’s FollowMyHealth portal, you will also be able to view your health information using other applications (apps) compatible with our system.

## 2022-01-06 NOTE — PROGRESS NOTE ADULT - SUBJECTIVE AND OBJECTIVE BOX
afberile  REVIEW OF SYSTEMS:  GEN: no fever,    no chills  RESP: no SOB,   no cough  CVS: no chest pain,   no palpitations  GI: no abdominal pain,   no nausea,   no vomiting,   no constipation,   no diarrhea  : no dysuria,   no frequency  NEURO: no headache,   no dizziness  PSYCH: no depression,   not anxious  Derm : no rash    MEDICATIONS  (STANDING):  aMIOdarone    Tablet 200 milliGRAM(s) Oral daily  apixaban 10 milliGRAM(s) Oral every 12 hours  artificial  tears Solution 1 Drop(s) Both EYES four times a day  erythromycin   Ointment 1 Application(s) Both EYES at bedtime  melatonin 3 milliGRAM(s) Oral at bedtime  metoprolol succinate ER 25 milliGRAM(s) Oral two times a day  QUEtiapine 25 milliGRAM(s) Oral at bedtime  sodium chloride 0.9%. 1000 milliLiter(s) (50 mL/Hr) IV Continuous <Continuous>    MEDICATIONS  (PRN):      Vital Signs Last 24 Hrs  T(C): 36.4 (06 Jan 2022 05:13), Max: 36.9 (05 Jan 2022 13:59)  T(F): 97.5 (06 Jan 2022 05:13), Max: 98.5 (05 Jan 2022 13:59)  HR: 63 (06 Jan 2022 05:13) (58 - 71)  BP: 117/53 (06 Jan 2022 05:13) (99/72 - 131/78)  BP(mean): --  RR: 18 (06 Jan 2022 07:30) (16 - 18)  SpO2: 97% (06 Jan 2022 05:13) (96% - 98%)  CAPILLARY BLOOD GLUCOSE        I&O's Summary    05 Jan 2022 07:01  -  06 Jan 2022 07:00  --------------------------------------------------------  IN: 280 mL / OUT: 300 mL / NET: -20 mL        PHYSICAL EXAM:  HEAD:  Atraumatic, Normocephalic  NECK: Supple, No   JVD  CHEST/LUNG:   no     rales,     no,    rhonchi  HEART: Regular rate and rhythm;         murmur  ABDOMEN: Soft, Nontender, ;   EXTREMITIES:    no    edema  NEUROLOGY:  alert    LABS:    01-05    142  |  102  |  48<H>  ----------------------------<  118<H>  3.5   |  19<L>  |  1.44<H>    Ca    9.2      05 Jan 2022 06:58                      Thyroid Stimulating Hormone, Serum: 3.03 uIU/mL (12-31 @ 08:46)          Consultant(s) Notes Reviewed:      Care Discussed with Consultants/Other Providers:

## 2022-01-06 NOTE — PROGRESS NOTE ADULT - SUBJECTIVE AND OBJECTIVE BOX
EPS    PROGRESS  NOTE   ________________________________________________    CHIEF COMPLAINT:Patient is a 81y old  Male who presents with a chief complaint of AMS, SOB  x 1 week (06 Jan 2022 11:52)  no complain.  	  REVIEW OF SYSTEMS:  CONSTITUTIONAL: No fever, weight loss, or fatigue  EYES: No eye pain, visual disturbances, or discharge  ENT:  No difficulty hearing, tinnitus, vertigo; No sinus or throat pain  NECK: No pain or stiffness  RESPIRATORY: No cough, wheezing, chills or hemoptysis; No Shortness of Breath  CARDIOVASCULAR: No chest pain, palpitations, passing out, dizziness, or leg swelling  GASTROINTESTINAL: No abdominal or epigastric pain. No nausea, vomiting, or hematemesis; No diarrhea or constipation. No melena or hematochezia.  GENITOURINARY: No dysuria, frequency, hematuria, or incontinence  NEUROLOGICAL: No headaches, memory loss, loss of strength, numbness, or tremors  SKIN: No itching, burning, rashes, or lesions   LYMPH Nodes: No enlarged glands  ENDOCRINE: No heat or cold intolerance; No hair loss  MUSCULOSKELETAL: No joint pain or swelling; No muscle, back, or extremity pain  PSYCHIATRIC: No depression, anxiety, mood swings, or difficulty sleeping  HEME/LYMPH: No easy bruising, or bleeding gums  ALLERGY AND IMMUNOLOGIC: No hives or eczema	    [ ] All others negative	  [ x] Unable to obtain    PHYSICAL EXAM:  T(C): 36.7 (01-06-22 @ 12:05), Max: 36.9 (01-05-22 @ 13:59)  HR: 64 (01-06-22 @ 12:05) (58 - 71)  BP: 120/82 (01-06-22 @ 12:05) (99/72 - 131/78)  RR: 18 (01-06-22 @ 12:05) (16 - 18)  SpO2: 96% (01-06-22 @ 12:05) (96% - 98%)  Wt(kg): --  I&O's Summary    05 Jan 2022 07:01  -  06 Jan 2022 07:00  --------------------------------------------------------  IN: 280 mL / OUT: 300 mL / NET: -20 mL    06 Jan 2022 07:01  -  06 Jan 2022 13:25  --------------------------------------------------------  IN: 120 mL / OUT: 350 mL / NET: -230 mL        Appearance: Normal	  HEENT:   Normal oral mucosa, PERRL, EOMI	  Lymphatic: No lymphadenopathy  Cardiovascular: Normal S1 S2, No JVD, + murmurs, No edema  Respiratory: Lungs clear to auscultation	  Gastrointestinal:  Soft, Non-tender, + BS	  Skin: No rashes, No ecchymoses, No cyanosis	  Neurologic: Non-focal  Extremities: Normal range of motion, No clubbing, cyanosis or edema  Vascular: Peripheral pulses palpable 2+ bilaterally    MEDICATIONS  (STANDING):  aMIOdarone    Tablet 200 milliGRAM(s) Oral daily  apixaban 10 milliGRAM(s) Oral every 12 hours  artificial  tears Solution 1 Drop(s) Both EYES four times a day  erythromycin   Ointment 1 Application(s) Both EYES at bedtime  melatonin 3 milliGRAM(s) Oral at bedtime  metoprolol succinate ER 25 milliGRAM(s) Oral two times a day  QUEtiapine 25 milliGRAM(s) Oral at bedtime  sodium chloride 0.9%. 1000 milliLiter(s) (50 mL/Hr) IV Continuous <Continuous>      TELEMETRY: 	    ECG:  	  RADIOLOGY:  OTHER: 	  	  LABS:	 	    CARDIAC MARKERS:            01-05    142  |  102  |  48<H>  ----------------------------<  118<H>  3.5   |  19<L>  |  1.44<H>    Ca    9.2      05 Jan 2022 06:58      proBNP: Serum Pro-Brain Natriuretic Peptide: 41982 pg/mL (12-30 @ 01:37)    Lipid Profile:   HgA1c:   TSH: Thyroid Stimulating Hormone, Serum: 3.03 uIU/mL (12-31 @ 08:46)          Assessment and plan  ---------------------------  80 yo hx htn, hld, 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  BPH pw confusion. Obtained history from patients daughter Chika - Patient noted to have shortness of breath   noted to have dyspnea on exertion associated with cough tested for Covid negative pmd s office. Unable to obtain review of systems due to patient condition. Patient poor historian.   Patient  s/p cataract surgery wears glasses since then.  pt with severe LV dysfunction etiology, ischemia needs to be ruled out not a candidate for aggressive measures  continue beta blocker  keep k>4  continue iv heparin, switched to eliquis  not a candidate for AICD  tsh  magnesium level  fu lytes closely  hiv/spep/upep/ferritin ?cause of cardiomyopathy  tele much improved  hydralazine and nitrate if bp tolerates  pt is DNR  increase beta blocker as tolerated, switched to bid  tele was dc d, if pt or afmily  does not want any medical therapy we should [possibly stop cardiac meds and transfer to Palliative care  continue ac  continue current meds

## 2022-01-09 RX ORDER — APIXABAN 2.5 MG/1
1 TABLET, FILM COATED ORAL
Qty: 60 | Refills: 0
Start: 2022-01-09 | End: 2022-02-07

## 2022-01-11 RX ORDER — APIXABAN 2.5 MG/1
1 TABLET, FILM COATED ORAL
Qty: 60 | Refills: 0
Start: 2022-01-11 | End: 2022-02-09

## 2022-01-12 LAB
% ALBUMIN: 53.4 % — SIGNIFICANT CHANGE UP
% ALPHA 1: 6.5 % — SIGNIFICANT CHANGE UP
% ALPHA 2: 11 % — SIGNIFICANT CHANGE UP
% BETA: 12.9 % — SIGNIFICANT CHANGE UP
% GAMMA: 16.2 % — SIGNIFICANT CHANGE UP
ALBUMIN SERPL ELPH-MCNC: 4.1 G/DL — SIGNIFICANT CHANGE UP (ref 3.6–5.5)
ALBUMIN/GLOB SERPL ELPH: 1.1 RATIO — SIGNIFICANT CHANGE UP
ALPHA1 GLOB SERPL ELPH-MCNC: 0.5 G/DL — HIGH (ref 0.1–0.4)
ALPHA2 GLOB SERPL ELPH-MCNC: 0.8 G/DL — SIGNIFICANT CHANGE UP (ref 0.5–1)
B-GLOBULIN SERPL ELPH-MCNC: 1 G/DL — SIGNIFICANT CHANGE UP (ref 0.5–1)
GAMMA GLOBULIN: 1.2 G/DL — SIGNIFICANT CHANGE UP (ref 0.6–1.6)
PROT PATTERN SERPL ELPH-IMP: SIGNIFICANT CHANGE UP
PROT SERPL-MCNC: 7.7 G/DL — SIGNIFICANT CHANGE UP (ref 6–8.3)
PROT SERPL-MCNC: 7.7 G/DL — SIGNIFICANT CHANGE UP (ref 6–8.3)

## 2022-12-10 NOTE — DIETITIAN INITIAL EVALUATION ADULT. - FACTORS AFF FOOD INTAKE
Chronic unstable  based on lipid panel in 2/2022 and pt ASCVD risk he should be on a statin  Atorvastatin 40mg daily started
change in mental status/difficulty feeding self

## 2023-04-13 NOTE — DIETITIAN INITIAL EVALUATION ADULT. - OTHER INFO
Goal Outcome Evaluation:  Pt alert and oriented x3 and can be forgetful. Bed alarm is on and call light within reach.  Pt was on 1.5 oxymask overnight sating >90%  A-fib HR 70-90's.  Denies pain.                          Pt visited at bedside, asleep at time of visit, 1:1 PCA present noted patient is very confused. Observed breakfast tray untouched, per PCA pt with poor intake today. Information obtained from extensive review of medical record, RN.    RN reports patient with varied intake depending on mental status, consumes 100% of trays at times, and less than 25% of trays at other times.     RN states ot with no observed difficulty chewing/swallowing. No reports of vomiting, diarrhea, constipation. RN notes bowel movements regular, last BM today 1/4.    Pt with elevated BUN/Creatinine due to JEWEL. Recommend Ensure Enlive x 2 daily due to increased nutrient needs, inconsistent intake.  Patient with CHF, given current mental status, STAR education deferred, inappropriate at this time. Pt visited at bedside, asleep at time of visit, 1:1 PCA present noted patient is very confused. Observed breakfast tray untouched, per PCA pt with poor intake today. Information obtained from extensive review of medical record, RN.    RN reports patient with varied intake depending on mental status, consumes 100% of trays at times, and less than 25% of trays at other times.     RN states pt with no observed difficulty chewing/swallowing. No reports of vomiting, diarrhea, constipation. RN notes bowel movements regular, last BM today 1/4.    Pt with elevated BUN/Creatinine due to JEWEL. Recommend Ensure Enlive x 2 daily due to increased nutrient needs, inconsistent intake. Patient with CHF, given current mental status, STAR education deferred, inappropriate at this time.

## 2023-05-17 NOTE — DISCHARGE NOTE PROVIDER - REASON FOR ADMISSION
Potassium 3.4, low.   Check magnesium level  Start potassium chloride 20 mEq p.o. q.day x2 weeks; no refills  In 2 weeks, recheck CMP and magnesium level  AMS, SOB  x 1 week

## 2023-10-26 NOTE — DISCHARGE NOTE NURSING/CASE MANAGEMENT/SOCIAL WORK - NSDCPEELIQUISFU_GEN_ALL_CORE
Go for blood tests as directed. Your doctor will do lab tests at regular visits to monitor the effects of this medicine. Please follow up with your doctor and keep your health care provider appointments.
balance training/bed mobility training/gait training/ROM/strengthening/transfer training

## 2024-11-05 NOTE — STUDENT SIGN OFF DOCUMENT - COPY OF STUDENT DOCUMENT REVIEW
Nutrition The patient is Stable - Low risk of patient condition declining or worsening    Shift Goals  Clinical Goals: Monitor and manage oxygen therapy, titrating to maintain oxygen saturation levels at or above 92%. Assess respiratory rate, rhythm, and depth frequently. Encourage deep breathing and coughing to clear mucus from the airways. Provide a calm, reassuring environment and offer emotional support to the patient. Educate the patient on asthma and the treatment plan to reduce fear and anxiety about the exacerbation. Encourage weight management discussions, if appropriate, in collaboration with the care team, dietitians, or weight management specialists.  Patient Goals: Breath better, eat and drink  Family Goals: MIKE      Problem: Pain - Standard  Goal: Alleviation of pain or a reduction in pain to the patient’s comfort goal  Outcome: Progressing     Problem: Knowledge Deficit - COPD  Goal: Patient/significant other demonstrates understanding of disease process, utilization of the Action Plan, medications and discharge instruction  Outcome: Progressing     Problem: Risk for Infection - COPD  Goal: Patient will remain free from signs and symptoms of infection  Outcome: Progressing     Problem: Nutrition - Advanced  Goal: Patient will display progressive weight gain toward goal have adequate food and fluid intake  Outcome: Progressing     Problem: Ineffective Airway Clearance  Goal: Patient will maintain patent airway with clear/clearing breath sounds  Outcome: Progressing     Problem: Impaired Gas Exchange  Goal: Patient will demonstrate improved ventilation and adequate oxygenation and participate in treatment regimen within the level of ability/situation.  Outcome: Progressing     Problem: Risk for Aspiration  Goal: Patient's risk for aspiration will be absent or decrease  Outcome: Progressing     Problem: Fall Risk  Goal: Patient will remain free from falls  Outcome: Progressing

## 2025-07-10 NOTE — DISCHARGE NOTE PROVIDER - NSRESEARCHGRANT_HIDDEN_GEN_A_CORE
Progress Note - Vascular Surgery   Name: Maxime Green 58 y.o. male I MRN: [id number]  Unit/Bed#: [facility] 517-01 I Date of Admission: 7/8/2025   Date of Service: 7/10/2025 I Hospital Day: 2    Assessment & Plan  Femoral-popliteal bypass graft occlusion, right, subsequent encounter  57 y/o M w/ history of PAD w/ claudication s/p Left SFA stent 1/20/22, Right Fem- POP bypass 2/3/2022 (Zev), RLE bypass graft occlusion s/p lysis, PTA, Viabahn stent 10/19/22 and repeat lysis with noted CFA stenosis above the bifurcation 6/2-3/2025. He now represents in transfer from [place] w/th RLE bypass graft occlusion. Initially he c/o rest pain but is now pain free at rest, M/S are intact. DP/PT appreciated with doppler.     POD 1 - thrombolysis with IR  Now with palpable R PT, doppler DT signal, foot is warm, pain controlled    Plan:  -Acute RLE bypass graft occlusion, asymptomatic at rest, likely returned to claudication as prior to bypass surgery.  -POD 1 thrombolysis with IR   -NPO after midnight for lysis recheck  -L groin access stable, soft, no oozing.   -Continue to hold Xarelto, hep gtt/alteplase gtt per IR protocol  -Frequent neurovascular checks  -P2Y12 result of 300, Plavix non-responder. Discussed with IR, will plan to Brilinta load after lysis procedure. Plavix discontinued.  - for price check for Brilinta, $0 copay  -Continue statin  -Pain control PRN  -ICU level of care while lysis catheter in place        Acquired hypofibrinogenemia (HCC)  S/p thrombolysis on 7/9 with IR  Initial fibrinogen 258 on admission  Down trending upon initiation of lysis, last result 70 this morning  tPA rate adjusted per IR protocol, continue frequent neurovascular checks, monitor for worsening bleeding  LUE PICC with some oozing at insertion site, L groin access site soft, no hematoma.   Continue to monitor    24 Hour Events : S/p thrombolysis 7/9  Subjective : Pt resting in hospital bed, anticipates going back to IR for lysis recheck  today. NPO after midnight, voiding spontaneously without catheter. Adequate pain control, denies numbness or tingling to BLE. Contacted overnight for concern of loss of doppler signals to R foot, now foot is warm with a palpable PT pulse.     Objective :  Temp:  [97.4 °F (36.3 °C)-98 °F (36.7 °C)] 98 °F (36.7 °C)  HR:  [46-96] 59  BP: (106-201)/(57-94) 149/65  Resp:  [8-29] 10  SpO2:  [95 %-100 %] 97 %  O2 Device: None (Room air)  Nasal Cannula O2 Flow Rate (L/min):  [2 L/min-4 L/min] 2 L/min  FiO2 (%):  [21] 21     I/O         07/08 0701  07/09 0700 07/09 0701  07/10 0700 07/10 0701  07/11 0700    P.O. 480 420     I.V. 1664.3 1268     IV Piggyback  182.3     Total Intake 2144.3 1870.2     Urine 2275 600     Total Output 2275 600     Net -130.8 +1270.2                  Lines/Drains/Airways       Active Status       Name Placement date Placement time Site Days    PICC Line 07/09/25 07/09/25  1349  --  less than 1                  Physical Exam  Vitals and nursing note reviewed.   Constitutional:       General: He is not in acute distress.     Appearance: He is well-developed.   HENT:      Head: Normocephalic and atraumatic.     Eyes:      Conjunctiva/sclera: Conjunctivae normal.       Cardiovascular:      Rate and Rhythm: Normal rate.      Pulses:           Dorsalis pedis pulses are detected w/ Doppler on the right side and detected w/ Doppler on the left side.        Posterior tibial pulses are 1+ on the right side and detected w/ Doppler on the left side.   Pulmonary:      Effort: Pulmonary effort is normal. No respiratory distress.   Abdominal:      Palpations: Abdomen is soft.      Tenderness: There is no abdominal tenderness.     Musculoskeletal:      Cervical back: Neck supple.      Right lower leg: No edema.      Left lower leg: No edema.      Comments: L groin access site soft, no oozing or hematoma     Skin:     General: Skin is warm and dry.      Capillary Refill: Capillary refill takes 2 to 3 seconds.  "    Neurological:      Mental Status: He is alert and oriented to person, place, and time.      Sensory: No sensory deficit.      Motor: No weakness.     Psychiatric:         Mood and Affect: Mood normal.           Lab Results: I have reviewed the following results:  CBC with diff:   Lab Results   Component Value Date    WBC 5.46 07/10/2025    HGB 10.8 (L) 07/10/2025    HCT 33.5 (L) 07/10/2025    MCV 82 07/10/2025     (L) 07/10/2025    RBC 4.10 07/10/2025    MCH 26.3 (L) 07/10/2025    MCHC 32.2 07/10/2025    RDW 13.8 07/10/2025    MPV 9.5 07/10/2025    NRBC 0 07/07/2025   ,   BMP/CMP:  Lab Results   Component Value Date    SODIUM 137 07/10/2025    K 4.0 07/10/2025     07/10/2025    CO2 26 07/10/2025    CO2 22 04/09/2021    BUN 15 07/10/2025    CREATININE 0.82 07/10/2025    GLUCOSE 145 (H) 04/09/2021    CALCIUM 8.5 07/10/2025    AST 21 07/07/2025    ALT 22 07/07/2025    ALKPHOS 78 07/07/2025    EGFR 96 07/10/2025   ,   Lipid Panel: No results found for: \"CHOL\",   Coags:   Lab Results   Component Value Date    PTT 45 (H) 07/10/2025    INR 1.16 07/09/2025   ,   Blood Culture: No results found for: \"BLOODCX\",   Urinalysis:   Lab Results   Component Value Date    COLORU Yellow 04/08/2021    CLARITYU Clear 04/08/2021    SPECGRAV 1.012 04/08/2021    PHUR 8.0 04/08/2021    LEUKOCYTESUR Negative 04/08/2021    NITRITE Negative 04/08/2021    GLUCOSEU Negative 04/08/2021    KETONESU Negative 04/08/2021    BILIRUBINUR Negative 04/08/2021    BLOODU Negative 04/08/2021   ,   Urine Culture: No results found for: \"URINECX\",   Wound Culure: No results found for: \"WOUNDCULT\"    Imaging Results Review: No pertinent imaging studies reviewed.  Other Study Results Review: No additional pertinent studies reviewed.    VTE Prophylaxis: VTE covered by:  heparin (porcine), Intravenous, 300 Units/hr at 07/09/25 1611  heparin, Intravenous, 20 Units/hr at 07/09/25 1404     " Yes